# Patient Record
Sex: MALE | Race: WHITE | Employment: OTHER | URBAN - METROPOLITAN AREA
[De-identification: names, ages, dates, MRNs, and addresses within clinical notes are randomized per-mention and may not be internally consistent; named-entity substitution may affect disease eponyms.]

---

## 2017-09-11 ENCOUNTER — APPOINTMENT (OUTPATIENT)
Dept: GENERAL RADIOLOGY | Age: 77
DRG: 287 | End: 2017-09-11
Attending: EMERGENCY MEDICINE
Payer: MEDICARE

## 2017-09-11 ENCOUNTER — APPOINTMENT (OUTPATIENT)
Dept: ULTRASOUND IMAGING | Age: 77
DRG: 287 | End: 2017-09-11
Attending: HOSPITALIST
Payer: MEDICARE

## 2017-09-11 ENCOUNTER — APPOINTMENT (OUTPATIENT)
Dept: CT IMAGING | Age: 77
DRG: 287 | End: 2017-09-11
Attending: HOSPITALIST
Payer: MEDICARE

## 2017-09-11 ENCOUNTER — APPOINTMENT (OUTPATIENT)
Dept: GENERAL RADIOLOGY | Age: 77
DRG: 287 | End: 2017-09-11
Attending: HOSPITALIST
Payer: MEDICARE

## 2017-09-11 ENCOUNTER — HOSPITAL ENCOUNTER (INPATIENT)
Age: 77
LOS: 5 days | Discharge: NURSING, REHAB OR LONG-TERM CARE | DRG: 287 | End: 2017-09-18
Attending: EMERGENCY MEDICINE | Admitting: INTERNAL MEDICINE
Payer: MEDICARE

## 2017-09-11 DIAGNOSIS — R73.9 HYPERGLYCEMIA: ICD-10-CM

## 2017-09-11 DIAGNOSIS — R77.8 ELEVATED TROPONIN: ICD-10-CM

## 2017-09-11 DIAGNOSIS — R55 SYNCOPE AND COLLAPSE: Primary | ICD-10-CM

## 2017-09-11 PROBLEM — R07.2 PRECORDIAL PAIN: Status: ACTIVE | Noted: 2017-09-11

## 2017-09-11 PROBLEM — Z91.199 NONCOMPLIANCE: Chronic | Status: ACTIVE | Noted: 2017-09-11

## 2017-09-11 PROBLEM — I50.22 CHRONIC SYSTOLIC CHF (CONGESTIVE HEART FAILURE) (HCC): Chronic | Status: ACTIVE | Noted: 2017-09-11

## 2017-09-11 PROBLEM — Z86.73 HX OF STROKE WITHOUT RESIDUAL DEFICITS: Chronic | Status: ACTIVE | Noted: 2017-09-11

## 2017-09-11 PROBLEM — N17.9 AKI (ACUTE KIDNEY INJURY) (HCC): Status: ACTIVE | Noted: 2017-09-11

## 2017-09-11 PROBLEM — I25.810 CORONARY ARTERY DISEASE INVOLVING CORONARY BYPASS GRAFT: Chronic | Status: ACTIVE | Noted: 2017-09-11

## 2017-09-11 LAB
ALBUMIN SERPL-MCNC: 3.6 G/DL (ref 3.2–4.6)
ALBUMIN/GLOB SERPL: 1 {RATIO} (ref 1.2–3.5)
ALP SERPL-CCNC: 140 U/L (ref 50–136)
ALT SERPL-CCNC: 13 U/L (ref 12–65)
ANION GAP SERPL CALC-SCNC: 11 MMOL/L (ref 7–16)
APPEARANCE UR: CLEAR
AST SERPL-CCNC: 7 U/L (ref 15–37)
BACTERIA URNS QL MICRO: 0 /HPF
BASOPHILS # BLD: 0 K/UL (ref 0–0.2)
BASOPHILS NFR BLD: 1 % (ref 0–2)
BILIRUB SERPL-MCNC: 0.6 MG/DL (ref 0.2–1.1)
BILIRUB UR QL: NEGATIVE
BNP SERPL-MCNC: 282 PG/ML
BUN SERPL-MCNC: 17 MG/DL (ref 8–23)
CALCIUM SERPL-MCNC: 9 MG/DL (ref 8.3–10.4)
CASTS URNS QL MICRO: 0 /LPF
CHLORIDE SERPL-SCNC: 102 MMOL/L (ref 98–107)
CO2 SERPL-SCNC: 25 MMOL/L (ref 21–32)
COLOR UR: YELLOW
CREAT SERPL-MCNC: 1.38 MG/DL (ref 0.8–1.5)
CRP SERPL-MCNC: 0.5 MG/DL (ref 0–0.9)
D DIMER PPP FEU-MCNC: 0.55 UG/ML(FEU)
DIFFERENTIAL METHOD BLD: ABNORMAL
EOSINOPHIL # BLD: 0.1 K/UL (ref 0–0.8)
EOSINOPHIL NFR BLD: 2 % (ref 0.5–7.8)
EPI CELLS #/AREA URNS HPF: 0 /HPF
ERYTHROCYTE [DISTWIDTH] IN BLOOD BY AUTOMATED COUNT: 12.9 % (ref 11.9–14.6)
GLOBULIN SER CALC-MCNC: 3.5 G/DL (ref 2.3–3.5)
GLUCOSE BLD STRIP.AUTO-MCNC: 109 MG/DL (ref 65–100)
GLUCOSE BLD STRIP.AUTO-MCNC: 163 MG/DL (ref 65–100)
GLUCOSE SERPL-MCNC: 541 MG/DL (ref 65–100)
GLUCOSE UR STRIP.AUTO-MCNC: >1000 MG/DL
HCT VFR BLD AUTO: 38.2 % (ref 41.1–50.3)
HGB BLD-MCNC: 13.8 G/DL (ref 13.6–17.2)
HGB UR QL STRIP: ABNORMAL
IMM GRANULOCYTES # BLD: 0 K/UL (ref 0–0.5)
IMM GRANULOCYTES NFR BLD: 0.2 % (ref 0–5)
KETONES UR QL STRIP.AUTO: 15 MG/DL
LEUKOCYTE ESTERASE UR QL STRIP.AUTO: NEGATIVE
LYMPHOCYTES # BLD: 1.1 K/UL (ref 0.5–4.6)
LYMPHOCYTES NFR BLD: 18 % (ref 13–44)
MAGNESIUM SERPL-MCNC: 1.8 MG/DL (ref 1.8–2.4)
MCH RBC QN AUTO: 30.5 PG (ref 26.1–32.9)
MCHC RBC AUTO-ENTMCNC: 36.1 G/DL (ref 31.4–35)
MCV RBC AUTO: 84.3 FL (ref 79.6–97.8)
MONOCYTES # BLD: 0.4 K/UL (ref 0.1–1.3)
MONOCYTES NFR BLD: 6 % (ref 4–12)
NEUTS SEG # BLD: 4.6 K/UL (ref 1.7–8.2)
NEUTS SEG NFR BLD: 73 % (ref 43–78)
NITRITE UR QL STRIP.AUTO: NEGATIVE
PH UR STRIP: 5 [PH] (ref 5–9)
PLATELET # BLD AUTO: 179 K/UL (ref 150–450)
PMV BLD AUTO: 12 FL (ref 10.8–14.1)
POTASSIUM SERPL-SCNC: 4.4 MMOL/L (ref 3.5–5.1)
PROT SERPL-MCNC: 7.1 G/DL (ref 6.3–8.2)
PROT UR STRIP-MCNC: NEGATIVE MG/DL
RBC # BLD AUTO: 4.53 M/UL (ref 4.23–5.67)
RBC #/AREA URNS HPF: ABNORMAL /HPF
SODIUM SERPL-SCNC: 138 MMOL/L (ref 136–145)
SP GR UR REFRACTOMETRY: 1.04 (ref 1–1.02)
TROPONIN I SERPL-MCNC: 0.19 NG/ML (ref 0.02–0.05)
TROPONIN I SERPL-MCNC: 0.31 NG/ML (ref 0.02–0.05)
TSH SERPL DL<=0.005 MIU/L-ACNC: 1.4 UIU/ML (ref 0.36–3.74)
UROBILINOGEN UR QL STRIP.AUTO: 0.2 EU/DL (ref 0.2–1)
WBC # BLD AUTO: 6.2 K/UL (ref 4.3–11.1)
WBC URNS QL MICRO: ABNORMAL /HPF

## 2017-09-11 PROCEDURE — 74011000302 HC RX REV CODE- 302: Performed by: HOSPITALIST

## 2017-09-11 PROCEDURE — 86580 TB INTRADERMAL TEST: CPT | Performed by: HOSPITALIST

## 2017-09-11 PROCEDURE — 74011636637 HC RX REV CODE- 636/637: Performed by: EMERGENCY MEDICINE

## 2017-09-11 PROCEDURE — 96374 THER/PROPH/DIAG INJ IV PUSH: CPT | Performed by: EMERGENCY MEDICINE

## 2017-09-11 PROCEDURE — 84484 ASSAY OF TROPONIN QUANT: CPT | Performed by: EMERGENCY MEDICINE

## 2017-09-11 PROCEDURE — 74011250636 HC RX REV CODE- 250/636: Performed by: HOSPITALIST

## 2017-09-11 PROCEDURE — 82962 GLUCOSE BLOOD TEST: CPT

## 2017-09-11 PROCEDURE — 99218 HC RM OBSERVATION: CPT

## 2017-09-11 PROCEDURE — 73630 X-RAY EXAM OF FOOT: CPT

## 2017-09-11 PROCEDURE — 85025 COMPLETE CBC W/AUTO DIFF WBC: CPT | Performed by: EMERGENCY MEDICINE

## 2017-09-11 PROCEDURE — 96361 HYDRATE IV INFUSION ADD-ON: CPT | Performed by: EMERGENCY MEDICINE

## 2017-09-11 PROCEDURE — 86140 C-REACTIVE PROTEIN: CPT | Performed by: HOSPITALIST

## 2017-09-11 PROCEDURE — 81001 URINALYSIS AUTO W/SCOPE: CPT | Performed by: HOSPITALIST

## 2017-09-11 PROCEDURE — 99284 EMERGENCY DEPT VISIT MOD MDM: CPT | Performed by: EMERGENCY MEDICINE

## 2017-09-11 PROCEDURE — 83880 ASSAY OF NATRIURETIC PEPTIDE: CPT | Performed by: HOSPITALIST

## 2017-09-11 PROCEDURE — 74011250636 HC RX REV CODE- 250/636: Performed by: EMERGENCY MEDICINE

## 2017-09-11 PROCEDURE — 85379 FIBRIN DEGRADATION QUANT: CPT | Performed by: HOSPITALIST

## 2017-09-11 PROCEDURE — 96366 THER/PROPH/DIAG IV INF ADDON: CPT | Performed by: EMERGENCY MEDICINE

## 2017-09-11 PROCEDURE — 93880 EXTRACRANIAL BILAT STUDY: CPT

## 2017-09-11 PROCEDURE — 70450 CT HEAD/BRAIN W/O DYE: CPT

## 2017-09-11 PROCEDURE — 71010 XR CHEST PORT: CPT

## 2017-09-11 PROCEDURE — 74011250637 HC RX REV CODE- 250/637: Performed by: HOSPITALIST

## 2017-09-11 PROCEDURE — 80053 COMPREHEN METABOLIC PANEL: CPT | Performed by: EMERGENCY MEDICINE

## 2017-09-11 PROCEDURE — 74011636637 HC RX REV CODE- 636/637: Performed by: HOSPITALIST

## 2017-09-11 PROCEDURE — 93005 ELECTROCARDIOGRAM TRACING: CPT | Performed by: EMERGENCY MEDICINE

## 2017-09-11 PROCEDURE — 84443 ASSAY THYROID STIM HORMONE: CPT | Performed by: HOSPITALIST

## 2017-09-11 PROCEDURE — 83735 ASSAY OF MAGNESIUM: CPT | Performed by: HOSPITALIST

## 2017-09-11 RX ORDER — INSULIN LISPRO 100 [IU]/ML
INJECTION, SOLUTION INTRAVENOUS; SUBCUTANEOUS
Status: DISCONTINUED | OUTPATIENT
Start: 2017-09-11 | End: 2017-09-18 | Stop reason: HOSPADM

## 2017-09-11 RX ORDER — ATORVASTATIN CALCIUM 40 MG/1
40 TABLET, FILM COATED ORAL
Status: DISCONTINUED | OUTPATIENT
Start: 2017-09-11 | End: 2017-09-18 | Stop reason: HOSPADM

## 2017-09-11 RX ORDER — DOCUSATE SODIUM 100 MG/1
100 CAPSULE, LIQUID FILLED ORAL DAILY
Status: DISCONTINUED | OUTPATIENT
Start: 2017-09-12 | End: 2017-09-18 | Stop reason: HOSPADM

## 2017-09-11 RX ORDER — SODIUM CHLORIDE 0.9 % (FLUSH) 0.9 %
5-10 SYRINGE (ML) INJECTION EVERY 8 HOURS
Status: DISCONTINUED | OUTPATIENT
Start: 2017-09-11 | End: 2017-09-18 | Stop reason: HOSPADM

## 2017-09-11 RX ORDER — INSULIN GLARGINE 100 [IU]/ML
8 INJECTION, SOLUTION SUBCUTANEOUS
Status: DISCONTINUED | OUTPATIENT
Start: 2017-09-11 | End: 2017-09-13

## 2017-09-11 RX ORDER — SODIUM CHLORIDE 9 MG/ML
75 INJECTION, SOLUTION INTRAVENOUS CONTINUOUS
Status: DISCONTINUED | OUTPATIENT
Start: 2017-09-11 | End: 2017-09-12

## 2017-09-11 RX ORDER — SODIUM CHLORIDE 0.9 % (FLUSH) 0.9 %
5-10 SYRINGE (ML) INJECTION AS NEEDED
Status: DISCONTINUED | OUTPATIENT
Start: 2017-09-11 | End: 2017-09-18 | Stop reason: HOSPADM

## 2017-09-11 RX ORDER — GUAIFENESIN 100 MG/5ML
81 LIQUID (ML) ORAL DAILY
Status: DISCONTINUED | OUTPATIENT
Start: 2017-09-12 | End: 2017-09-18 | Stop reason: HOSPADM

## 2017-09-11 RX ORDER — HEPARIN SODIUM 5000 [USP'U]/ML
4000 INJECTION, SOLUTION INTRAVENOUS; SUBCUTANEOUS ONCE
Status: DISCONTINUED | OUTPATIENT
Start: 2017-09-12 | End: 2017-09-11 | Stop reason: ALTCHOICE

## 2017-09-11 RX ORDER — HEPARIN SODIUM 5000 [USP'U]/100ML
12-25 INJECTION, SOLUTION INTRAVENOUS
Status: DISCONTINUED | OUTPATIENT
Start: 2017-09-12 | End: 2017-09-11

## 2017-09-11 RX ORDER — AMLODIPINE BESYLATE 10 MG/1
10 TABLET ORAL DAILY
Status: DISCONTINUED | OUTPATIENT
Start: 2017-09-12 | End: 2017-09-14

## 2017-09-11 RX ORDER — ACETAMINOPHEN 325 MG/1
650 TABLET ORAL
Status: DISCONTINUED | OUTPATIENT
Start: 2017-09-11 | End: 2017-09-18 | Stop reason: HOSPADM

## 2017-09-11 RX ADMIN — SODIUM CHLORIDE 1000 ML: 900 INJECTION, SOLUTION INTRAVENOUS at 17:38

## 2017-09-11 RX ADMIN — ATORVASTATIN CALCIUM 40 MG: 40 TABLET, FILM COATED ORAL at 22:42

## 2017-09-11 RX ADMIN — APIXABAN 5 MG: 5 TABLET, FILM COATED ORAL at 22:42

## 2017-09-11 RX ADMIN — INSULIN HUMAN 10 UNITS: 100 INJECTION, SOLUTION PARENTERAL at 17:38

## 2017-09-11 RX ADMIN — Medication 10 ML: at 22:42

## 2017-09-11 RX ADMIN — TUBERCULIN PURIFIED PROTEIN DERIVATIVE 5 UNITS: 5 INJECTION, SOLUTION INTRADERMAL at 22:43

## 2017-09-11 RX ADMIN — SODIUM CHLORIDE 75 ML/HR: 900 INJECTION, SOLUTION INTRAVENOUS at 19:26

## 2017-09-11 RX ADMIN — INSULIN GLARGINE 8 UNITS: 100 INJECTION, SOLUTION SUBCUTANEOUS at 22:41

## 2017-09-11 NOTE — ED NOTES
Patient to ed via [facility]. Patient with syncopal episode at grocery store. Per ems, + LOC. Patient alert/oriented upon ems arrival on scene. No apparent injuries from syncopal episode. BGL 464mg/dl initally. Patient has been w/o his insulin x 1 week.

## 2017-09-11 NOTE — H&P
HOSPITALIST H&P/CONSULT  NAME:  Mishel Calvert   Age:  67 y.o.  :   1940   DOS:               17  MRN:   [id number]  PCP: Juan Osman NP  Consulting MD:  Treatment Team: Attending Provider: Karen Everett MD; Primary Nurse: Varghese Guzmán, RN    HPI:   Mr. Sunni Hawthorne is a 68 yo M with PMHx of CAD s/p CABG x2 (1st one when he was 34 yo), chronic systolic CHF with EF 14% in 10/10/2015 followed by Dr. Kevin Ribera MD, IDDM on novolog Mix 70/30 12 UI BID, HTN, Hx of recurrent CVA on chronic eliquis, hx of noncompliance with medication presented to [facility] brought by EMS after he had a syncopal event while he was doing his groceries. Unknown how log LOC was. Unknown if head trauma. States that prior to that he had severe chest pain in midsternal area 7/10 , w/o radiation for  approx 6-0 minutes. Received  mg  And 1 L NS per EMS. Mr. Sunni Hawthorne states that he didn't took his insulin for florence=porx 10 days. He states that he didn't took his other medications either, although family at bedside states that he is taking only his eliquis. Complains of polyuria and polydipsia in the last week. In ED WBC:6.2, BS:541, Cr:1.38, Trop I:0.19, EKG with old LBBB, sinus tach with fusion complexes.        10+ point ROS done and is negative except as noted in HPI. Past Medical History:   Diagnosis Date    CVA (cerebral infarction)     Diabetes ([facility] [address]     HTN (hypertension)       Past Surgical History:   Procedure Laterality Date    CARDIAC SURG PROCEDURE UNLIST      CABG      Prior to Admission Medications   Prescriptions Last Dose Informant Patient Reported? Taking? amLODIPine (NORVASC) 10 mg tablet   No No   Sig: Take 1 Tab by mouth daily. aspirin (ASPIRIN) 325 mg tablet   No No   Sig: Take 1 Tab by mouth daily. atorvastatin (LIPITOR) 40 mg tablet   No No   Sig: Take 1 Tab by mouth nightly. glyBURIDE (DIABETA) 2.5 mg tablet   No No   Sig: Take 1 Tab by mouth Daily (before breakfast).    magnesium oxide (MAG-OX) 400 mg tablet   No No   Sig: Take 1 tablet by mouth daily. nitroglycerin (NITROSTAT) 0.4 mg SL tablet   No No   Sig: Take 1 Tab by mouth every five (5) minutes as needed for Chest Pain. Sit/Lay down then put one tab under the tongue every 5 minutes as needed for chest pain for 3 doses      Facility-Administered Medications: None     Home meds reconciled. No Known Allergies   Social History   Substance Use Topics    Smoking status: Never Smoker    Smokeless tobacco: Never Used    Alcohol use No      Family History   Problem Relation Age of Onset    Stroke Father       I personally reviewed home medications, social and family history. There is no immunization history for the selected administration types on file for this patient. Objective:     Patient Vitals for the past 24 hrs:   Temp Pulse Resp BP SpO2   17 1608 98.4 °F (36.9 °C) (!) 115 20 161/84 99 %     Temp (24hrs), Av.4 °F (36.9 °C), Min:98.4 °F (36.9 °C), Max:98.4 °F (36.9 °C)    Oxygen Therapy  O2 Sat (%): 99 % (17 1608)  O2 Device: Room air (17 1608)  Oxygen Therapy  O2 Sat (%): 99 % (17 1608)  O2 Device: Room air (17 1608)    Physical Exam:  General:         Alert, cooperative, no acute distress . Afebrile. Underweight. Chronically ill loking  HEENT:               NCAT. No obvious deformity. Nares normal. No drainage  Lungs:                 Decreased air entry b/l. No wheezing/rhonchi/rales  Cardiovascular:   irregular. + murmur. No pedal edema b/l. +2 PT/DT pulses b/l. Abdomen:       S/nt/nd. Bowel sounds normal. .   Skin:         Not Jaundiced. R toe with ulcer and necrotic tissue. Neurologic:    AAOx3. CN II- XII grossly WNL. No gross focal deficit. Moves all extremities. Psychiatric:         Good mood. Normal affect.        Data Review:   Recent Results (from the past 24 hour(s))   CBC WITH AUTOMATED DIFF    Collection Time: 17  4:19 PM   Result Value Ref Range    WBC 6.2 4.3 - 11.1 K/uL    RBC 4. 53 4.23 - 5.67 M/uL    HGB 13.8 13.6 - 17.2 g/dL    HCT 38.2 (L) 41.1 - 50.3 %    MCV 84.3 79.6 - 97.8 FL    MCH 30.5 26.1 - 32.9 PG    MCHC 36.1 (H) 31.4 - 35.0 g/dL    RDW 12.9 11.9 - 14.6 %    PLATELET 774 205 - 549 K/uL    MPV 12.0 10.8 - 14.1 FL    DF AUTOMATED      NEUTROPHILS 73 43 - 78 %    LYMPHOCYTES 18 13 - 44 %    MONOCYTES 6 4.0 - 12.0 %    EOSINOPHILS 2 0.5 - 7.8 %    BASOPHILS 1 0.0 - 2.0 %    IMMATURE GRANULOCYTES 0.2 0.0 - 5.0 %    ABS. NEUTROPHILS 4.6 1.7 - 8.2 K/UL    ABS. LYMPHOCYTES 1.1 0.5 - 4.6 K/UL    ABS. MONOCYTES 0.4 0.1 - 1.3 K/UL    ABS. EOSINOPHILS 0.1 0.0 - 0.8 K/UL    ABS. BASOPHILS 0.0 0.0 - 0.2 K/UL    ABS. IMM. GRANS. 0.0 0.0 - 0.5 K/UL   METABOLIC PANEL, COMPREHENSIVE    Collection Time: 09/11/17  4:19 PM   Result Value Ref Range    Sodium 138 136 - 145 mmol/L    Potassium 4.4 3.5 - 5.1 mmol/L    Chloride 102 98 - 107 mmol/L    CO2 25 21 - 32 mmol/L    Anion gap 11 7 - 16 mmol/L    Glucose 541 (HH) 65 - 100 mg/dL    BUN 17 8 - 23 MG/DL    Creatinine 1.38 0.8 - 1.5 MG/DL    GFR est AA >60 >60 ml/min/1.73m2    GFR est non-AA 53 (L) >60 ml/min/1.73m2    Calcium 9.0 8.3 - 10.4 MG/DL    Bilirubin, total 0.6 0.2 - 1.1 MG/DL    ALT (SGPT) 13 12 - 65 U/L    AST (SGOT) 7 (L) 15 - 37 U/L    Alk.  phosphatase 140 (H) 50 - 136 U/L    Protein, total 7.1 6.3 - 8.2 g/dL    Albumin 3.6 3.2 - 4.6 g/dL    Globulin 3.5 2.3 - 3.5 g/dL    A-G Ratio 1.0 (L) 1.2 - 3.5     TROPONIN I    Collection Time: 09/11/17  4:19 PM   Result Value Ref Range    Troponin-I, Qt. 0.19 (HH) 0.02 - 0.05 NG/ML   BNP    Collection Time: 09/11/17  4:19 PM   Result Value Ref Range     pg/mL     All Micro Results     None          Imaging /Procedures /Studies:  I personally reviewed all labs, imaging, and other studies this admission:  CXR Results  (Last 48 hours)               09/11/17 7965  XR CHEST PORT Final result    Impression:  IMPRESSION: No acute findings in the chest           Narrative:  Chest X-ray INDICATION:   Syncope, chest pain       A portable AP view of the chest was obtained. FINDINGS: The lungs are clear. There are no infiltrates or effusions. There is   stable mild cardiomegaly. Sternotomy changes are present. CT Results  (Last 48 hours)    None              Assessment and Plan: Active Hospital Problems    Diagnosis Date Noted    Syncope and collapse 09/11/2017    Precordial pain 09/11/2017    Hx of stroke without residual deficits 09/11/2017    YULY (acute kidney injury) ([facility] [address] 09/11/2017    Chronic systolic CHF (congestive heart failure) ([facility] [address] 09/11/2017    Coronary artery disease involving coronary bypass graft 09/11/2017    Noncompliance 09/11/2017    HTN (hypertension) 10/10/2015    DM [facility]di[facility] mellitus) [facility] 10/10/2015       PLAN  Syncope and collapse: gentle IV hydration. serial Trop I. D-dimer. Echocardiogram. US b/l ICa. US venous LE. When more stable will get orthostatics. Get CT Head due to fall on Eliquis. Chest pain. Serial Trop I/EKG. Telemetry. Already received  mg. Consult cardiology. ED already discussed with cardiology on call. Appreciate the help. IDDM - non complaint. Start ISS and Lantus. Check Hg1c. Diabetes education. IV hydration    YULY - get UA. IV hydration. Chronic systolic CHF - will get Echocardiogram. Daily weight. Strict I/O. Will defer to cardiology. HTN: resume home meds    Noncompliance  : educated      DVT ppx: Eliquis - if CR head negative for bleding    Code status:  Full CODE  Estimated LOS:  1-2 days  Risk assessment:  high  Plan of care discussed with: patient and his family ta bedside in ED.  Care team.        Claudette Pax, MD  09/11/17

## 2017-09-11 NOTE — Clinical Note
Patient Class[de-identified] Observation [982] Type of Bed: Remote Telemetry [29] Reason for Observation: syncope. chest pain Admitting Diagnosis: Syncope and collapse [780. 2. ICD-9-CM] Admitting Physician: Judy Kim [[id number]] Attending Physician: Judy Kim [[id number]]

## 2017-09-11 NOTE — ED PROVIDER NOTES
HPI Comments: Patient is 76-year-old male who is coming in after having a syncopal episode. He has a history diabetes and normally takes insulin but he did not take it for the last 2 months due to cost.  He states he is not on any other medications. Our computer records does list several other medications though. He is a poor historian  And does not really remember having a syncopal episode. He denies any chest pain or shortness of breath to me now. But he does believe he had some earlier today. Per report patient given aspirin and ntg once with ems as he was complaining of chest pain then. Patient is a 67 y.o. male presenting with syncope. The history is provided by the patient. Syncope    Pertinent negatives include no chest pain, no palpitations, no fever, no abdominal pain, no nausea and no vomiting. Past Medical History:   Diagnosis Date    CVA (cerebral infarction) 2010    Diabetes ([facility] [address]     HTN (hypertension)        Past Surgical History:   Procedure Laterality Date    CARDIAC SURG PROCEDURE UNLIST      CABG         Family History:   Problem Relation Age of Onset    Stroke Father        Social History     Social History    Marital status:      Spouse name: N/A    Number of children: N/A    Years of education: N/A     Occupational History    Not on file. Social History Main Topics    Smoking status: Never Smoker    Smokeless tobacco: Never Used    Alcohol use No    Drug use: No    Sexual activity: Not on file     Other Topics Concern    Not on file     Social History Narrative         ALLERGIES: Review of patient's allergies indicates no known allergies. Review of Systems   Constitutional: Negative for chills and fever. Respiratory: Negative for chest tightness, shortness of breath, wheezing and stridor. Cardiovascular: Positive for syncope. Negative for chest pain and palpitations.    Gastrointestinal: Negative for abdominal pain, diarrhea, nausea and vomiting. Skin: Negative. All other systems reviewed and are negative. Vitals:    09/11/17 1608   BP: 161/84   Pulse: (!) 115   Resp: 20   Temp: 98.4 °F (36.9 °C)   SpO2: 99%   Weight: 70.3 kg (155 lb)   Height: 5' 11\" (1.803 m)            Physical Exam   Constitutional: He is oriented to person, place, and time. He appears well-developed and well-nourished. No distress. HENT:   Head: Normocephalic and atraumatic. Eyes: Conjunctivae are normal. No scleral icterus. Neck: Normal range of motion. Neck supple. Cardiovascular: Normal rate, regular rhythm, normal heart sounds and intact distal pulses. Exam reveals no gallop and no friction rub. No murmur heard. Pulmonary/Chest: Effort normal and breath sounds normal. No stridor. No respiratory distress. He has no wheezes. He has no rales. He exhibits no tenderness. Abdominal: Soft. He exhibits no distension. There is no tenderness. There is no rebound and no guarding. Neurological: He is alert and oriented to person, place, and time. No cranial nerve deficit. Coordination normal.   No focal weakness   Skin: Skin is warm and dry. No rash noted. He is not diaphoretic. No erythema. Psychiatric: He has a normal mood and affect. His behavior is normal.   Nursing note and vitals reviewed. MDM  Number of Diagnoses or Management Options  Diagnosis management comments: Patient currently has no chest pain does have a slightly elevated troponin of 0.19 and an elevated sugar of 541 no anion gap. I discussed case with Dr. Michell Meeks of cardiology and given the insulin noncompliance high glucose no active chest pain we will discuss with hospitalist for admission. I will treat her elevated glucose and we will repeat troponin levels. Patient given aspirin prior to arrival.      Louise Gold MD; 9/11/2017 @6:07 PM Voice dictation software was used during the making of this note.   This software is not perfect and grammatical and other typographical errors may be present.   This note has not been proofread for errors.  ===================================================================        Amount and/or Complexity of Data Reviewed  Clinical lab tests: ordered and reviewed (Results for orders placed or performed during the hospital encounter of 09/11/17  -CBC WITH AUTOMATED DIFF       Result                                            Value                         Ref Range                       WBC                                               6.2                           4.3 - 11.1 K/uL                 RBC                                               4.53                          4.23 - 5.67 M/uL                HGB                                               13.8                          13.6 - 17.2 g/dL                HCT                                               38.2 (L)                      41.1 - 50.3 %                   MCV                                               84.3                          79.6 - 97.8 FL                  MCH                                               30.5                          26.1 - 32.9 PG                  MCHC                                              36.1 (H)                      31.4 - 35.0 g/dL                RDW                                               12.9                          11.9 - 14.6 %                   PLATELET                                          179                           150 - 450 K/uL                  MPV                                               12.0                          10.8 - 14.1 FL                  DF                                                AUTOMATED                                                     NEUTROPHILS                                       73                            43 - 78 %                       LYMPHOCYTES                                       18                            13 - 44 %                       MONOCYTES 6                             4.0 - 12.0 %                    EOSINOPHILS                                       2                             0.5 - 7.8 %                     BASOPHILS                                         1                             0.0 - 2.0 %                     IMMATURE GRANULOCYTES                             0.2                           0.0 - 5.0 %                     ABS. NEUTROPHILS                                  4.6                           1.7 - 8.2 K/UL                  ABS. LYMPHOCYTES                                  1.1                           0.5 - 4.6 K/UL                  ABS. MONOCYTES                                    0.4                           0.1 - 1.3 K/UL                  ABS. EOSINOPHILS                                  0.1                           0.0 - 0.8 K/UL                  ABS. BASOPHILS                                    0.0                           0.0 - 0.2 K/UL                  ABS. IMM.  GRANS.                                  0.0                           0.0 - 0.5 K/UL             -METABOLIC PANEL, COMPREHENSIVE       Result                                            Value                         Ref Range                       Sodium                                            138                           136 - 145 mmol/L                Potassium                                         4.4                           3.5 - 5.1 mmol/L                Chloride                                          102                           98 - 107 mmol/L                 CO2                                               25                            21 - 32 mmol/L                  Anion gap                                         11                            7 - 16 mmol/L                   Glucose                                           541 (HH)                      65 - 100 mg/dL                  BUN                                               17 8 - 23 MG/DL                    Creatinine                                        1.38                          0.8 - 1.5 MG/DL                 GFR est AA                                        >60                           >60 ml/min/1.73m2               GFR est non-AA                                    53 (L)                        >60 ml/min/1.73m2               Calcium                                           9.0                           8.3 - 10.4 MG/DL                Bilirubin, total                                  0.6                           0.2 - 1.1 MG/DL                 ALT (SGPT)                                        13                            12 - 65 U/L                     AST (SGOT)                                        7 (L)                         15 - 37 U/L                     Alk. phosphatase                                  140 (H)                       50 - 136 U/L                    Protein, total                                    7.1                           6.3 - 8.2 g/dL                  Albumin                                           3.6                           3.2 - 4.6 g/dL                  Globulin                                          3.5                           2.3 - 3.5 g/dL                  A-G Ratio                                         1.0 (L)                       1.2 - 3.5                  -TROPONIN I       Result                                            Value                         Ref Range                       Troponin-I, Qt.                                   0.19 (HH)                     0.02 - 0.05 NG/ML         )  Tests in the radiology section of CPT®: ordered and reviewed  Independent visualization of images, tracings, or specimens: yes (LBBB rate 105 hx of LBBB)      ED Course       Procedures

## 2017-09-12 LAB
ATRIAL RATE: 105 BPM
CALCULATED P AXIS, ECG09: 64 DEGREES
CALCULATED R AXIS, ECG10: 48 DEGREES
CALCULATED T AXIS, ECG11: -169 DEGREES
DIAGNOSIS, 93000: NORMAL
EST. AVERAGE GLUCOSE BLD GHB EST-MCNC: 252 MG/DL
GLUCOSE BLD STRIP.AUTO-MCNC: 178 MG/DL (ref 65–100)
GLUCOSE BLD STRIP.AUTO-MCNC: 218 MG/DL (ref 65–100)
GLUCOSE BLD STRIP.AUTO-MCNC: 219 MG/DL (ref 65–100)
GLUCOSE BLD STRIP.AUTO-MCNC: 238 MG/DL (ref 65–100)
HBA1C MFR BLD: 10.4 % (ref 4.8–6)
MM INDURATION POC: 0 MM (ref 0–5)
P-R INTERVAL, ECG05: 154 MS
PPD POC: NORMAL NEGATIVE
Q-T INTERVAL, ECG07: 380 MS
QRS DURATION, ECG06: 148 MS
QTC CALCULATION (BEZET), ECG08: 502 MS
TROPONIN I SERPL-MCNC: 1.14 NG/ML (ref 0.02–0.05)
TROPONIN I SERPL-MCNC: 1.37 NG/ML (ref 0.02–0.05)
VENTRICULAR RATE, ECG03: 105 BPM

## 2017-09-12 PROCEDURE — 74011636637 HC RX REV CODE- 636/637: Performed by: HOSPITALIST

## 2017-09-12 PROCEDURE — 84484 ASSAY OF TROPONIN QUANT: CPT | Performed by: HOSPITALIST

## 2017-09-12 PROCEDURE — 36415 COLL VENOUS BLD VENIPUNCTURE: CPT | Performed by: HOSPITALIST

## 2017-09-12 PROCEDURE — 74011250636 HC RX REV CODE- 250/636: Performed by: NURSE PRACTITIONER

## 2017-09-12 PROCEDURE — C8929 TTE W OR WO FOL WCON,DOPPLER: HCPCS

## 2017-09-12 PROCEDURE — 74011250637 HC RX REV CODE- 250/637: Performed by: NURSE PRACTITIONER

## 2017-09-12 PROCEDURE — 74011250637 HC RX REV CODE- 250/637: Performed by: HOSPITALIST

## 2017-09-12 PROCEDURE — 99218 HC RM OBSERVATION: CPT

## 2017-09-12 PROCEDURE — 83036 HEMOGLOBIN GLYCOSYLATED A1C: CPT | Performed by: HOSPITALIST

## 2017-09-12 PROCEDURE — 82962 GLUCOSE BLOOD TEST: CPT

## 2017-09-12 PROCEDURE — 74011000250 HC RX REV CODE- 250: Performed by: HOSPITALIST

## 2017-09-12 PROCEDURE — 74011250636 HC RX REV CODE- 250/636: Performed by: HOSPITALIST

## 2017-09-12 RX ORDER — SODIUM CHLORIDE 9 MG/ML
75 INJECTION, SOLUTION INTRAVENOUS CONTINUOUS
Status: DISPENSED | OUTPATIENT
Start: 2017-09-12 | End: 2017-09-14

## 2017-09-12 RX ORDER — HEPARIN SODIUM 5000 [USP'U]/100ML
12-25 INJECTION, SOLUTION INTRAVENOUS
Status: DISCONTINUED | OUTPATIENT
Start: 2017-09-12 | End: 2017-09-13

## 2017-09-12 RX ORDER — HEPARIN SODIUM 5000 [USP'U]/ML
4000 INJECTION, SOLUTION INTRAVENOUS; SUBCUTANEOUS ONCE
Status: COMPLETED | OUTPATIENT
Start: 2017-09-12 | End: 2017-09-12

## 2017-09-12 RX ORDER — POVIDONE-IODINE 10 %
SOLUTION, NON-ORAL TOPICAL DAILY
Status: DISCONTINUED | OUTPATIENT
Start: 2017-09-12 | End: 2017-09-18 | Stop reason: HOSPADM

## 2017-09-12 RX ADMIN — Medication 5 ML: at 21:45

## 2017-09-12 RX ADMIN — AMLODIPINE BESYLATE 10 MG: 10 TABLET ORAL at 08:39

## 2017-09-12 RX ADMIN — INSULIN LISPRO 4 UNITS: 100 INJECTION, SOLUTION INTRAVENOUS; SUBCUTANEOUS at 11:30

## 2017-09-12 RX ADMIN — SODIUM CHLORIDE 50 ML/HR: 900 INJECTION, SOLUTION INTRAVENOUS at 11:29

## 2017-09-12 RX ADMIN — NITROGLYCERIN 1 INCH: 20 OINTMENT TOPICAL at 11:29

## 2017-09-12 RX ADMIN — DOCUSATE SODIUM 100 MG: 100 CAPSULE, LIQUID FILLED ORAL at 08:40

## 2017-09-12 RX ADMIN — PERFLUTREN 1 ML: 6.52 INJECTION, SUSPENSION INTRAVENOUS at 11:00

## 2017-09-12 RX ADMIN — NITROGLYCERIN 1 INCH: 20 OINTMENT TOPICAL at 17:11

## 2017-09-12 RX ADMIN — ATORVASTATIN CALCIUM 40 MG: 40 TABLET, FILM COATED ORAL at 21:45

## 2017-09-12 RX ADMIN — SODIUM CHLORIDE 50 ML/HR: 900 INJECTION, SOLUTION INTRAVENOUS at 05:37

## 2017-09-12 RX ADMIN — NITROGLYCERIN 1 INCH: 20 OINTMENT TOPICAL at 23:01

## 2017-09-12 RX ADMIN — INSULIN LISPRO 6 UNITS: 100 INJECTION, SOLUTION INTRAVENOUS; SUBCUTANEOUS at 21:44

## 2017-09-12 RX ADMIN — ASPIRIN 81 MG 81 MG: 81 TABLET ORAL at 08:40

## 2017-09-12 RX ADMIN — HEPARIN SODIUM AND DEXTROSE 12 UNITS/KG/HR: 5000; 5 INJECTION INTRAVENOUS at 23:03

## 2017-09-12 RX ADMIN — NITROGLYCERIN 1 INCH: 20 OINTMENT TOPICAL at 00:21

## 2017-09-12 RX ADMIN — Medication 10 ML: at 05:37

## 2017-09-12 RX ADMIN — HEPARIN SODIUM 4000 UNITS: 5000 INJECTION, SOLUTION INTRAVENOUS; SUBCUTANEOUS at 22:55

## 2017-09-12 RX ADMIN — Medication 10 ML: at 15:21

## 2017-09-12 RX ADMIN — INSULIN LISPRO 2 UNITS: 100 INJECTION, SOLUTION INTRAVENOUS; SUBCUTANEOUS at 08:39

## 2017-09-12 RX ADMIN — POVIDONE-IODINE: 10 SOLUTION TOPICAL at 15:21

## 2017-09-12 RX ADMIN — INSULIN GLARGINE 8 UNITS: 100 INJECTION, SOLUTION SUBCUTANEOUS at 21:42

## 2017-09-12 RX ADMIN — INSULIN LISPRO 4 UNITS: 100 INJECTION, SOLUTION INTRAVENOUS; SUBCUTANEOUS at 17:11

## 2017-09-12 RX ADMIN — NITROGLYCERIN 1 INCH: 20 OINTMENT TOPICAL at 05:36

## 2017-09-12 NOTE — CONSULTS
[facility] Cardiology Consult                Date of  Admission: 9/11/2017  4:12 PM     Primary Care Physician: Dr. Dwight Negron  Primary Cardiologist: Dr. Conti [facility])  Referring Physician: Dr. Jurado Comfort Physician: Dr. Radhames Mondragon    CC/Reason for consult: elevated troponin, chest pain       Teresa Fierro is a 67 y.o. male with past medical history of CAD with remote CABG, DM2, noncompliance with medications, remote CVAs on Eliquis, and HTN who presented to the ER via EMS after a syncopal episode with complaints of chest pain. Patient states that he was shopping for groceries when he passed out. Total length of LOC is unknown. After regaining consciousness, he reported chest pain across his chest. He reports a similar episode of chest pain approximately 2 weeks ago while cutting grass. He states that episode lasted approximately 30 minutes and went away on its own. Given patient's complaint of chest pain, EMS treated him with 324mg ASA and SL nitro x 1. Upon arrival to the ER, he denied chest pain or shortness of breath. Initial troponin was 0.19 with repeat of 0.31. Patient was also found to have a glucose of 541. He states that he is supposed to be on insulin but has not taken it in several months due to cost. Patient was admitted to the hospital by the hospitalist team. We have been asked to see patient in consultation regarding chest pain and elevated troponin.      Patient Active Problem List   Diagnosis Code    CVA (cerebral infarction) I63.9    HTN (hypertension) I10    DM (diabetes mellitus) ([facility] E11.9    Syncope and collapse R55    Precordial pain R07.2    Hx of stroke without residual deficits Z86.73    YULY (acute kidney injury) ([facility] N17.9    Chronic systolic CHF (congestive heart failure) ([facility]) I50.22    Coronary artery disease involving coronary bypass graft I25.810    Noncompliance Z91.19       Past Medical History:   Diagnosis Date    CVA (cerebral infarction) 2010    Diabetes ([facility]     HTN (hypertension)       Past Surgical History:   Procedure Laterality Date    CARDIAC SURG PROCEDURE UNLIST      CABG     No Known Allergies   Family History   Problem Relation Age of Onset    Stroke Father         Current Facility-Administered Medications   Medication Dose Route Frequency    insulin glargine (LANTUS) injection 8 Units  8 Units SubCUTAneous QHS    [START ON 9/12/2017] aspirin chewable tablet 81 mg  81 mg Oral DAILY    insulin lispro (HUMALOG) injection   SubCUTAneous AC&HS    0.9% sodium chloride infusion  75 mL/hr IntraVENous CONTINUOUS    [START ON 9/12/2017] amLODIPine (NORVASC) tablet 10 mg  10 mg Oral DAILY    atorvastatin (LIPITOR) tablet 40 mg  40 mg Oral QHS    sodium chloride (NS) flush 5-10 mL  5-10 mL IntraVENous Q8H    sodium chloride (NS) flush 5-10 mL  5-10 mL IntraVENous PRN    tuberculin injection 5 Units  5 Units IntraDERMal ONCE    acetaminophen (TYLENOL) tablet 650 mg  650 mg Oral Q4H PRN    promethazine (PHENERGAN) with saline injection 12.5 mg  12.5 mg IntraVENous Q6H PRN    [START ON 9/12/2017] docusate sodium (COLACE) capsule 100 mg  100 mg Oral DAILY    apixaban (ELIQUIS) tablet 5 mg  5 mg Oral Q12H    [START ON 9/12/2017] nitroglycerin (NITROBID) 2 % ointment 1 Inch  1 Inch Topical Q6H       Review of Systems   Cardiovascular: Positive for chest pain. Neurological: Positive for loss of consciousness. All other systems reviewed and are negative. Physical Exam  Vitals:    09/11/17 1608 09/11/17 2059 09/11/17 2201   BP: 161/84 124/75 129/79   Pulse: (!) 115 93 (!) 102   Resp: 20 20 20   Temp: 98.4 °F (36.9 °C) 98 °F (36.7 °C) 98.1 °F (36.7 °C)   SpO2: 99% 97% 98%   Weight: 70.3 kg (155 lb)  61.7 kg (136 lb 1.6 oz)   Height: 5' 11\" (1.803 m)  5' 11\" (1.803 m)       Physical Exam:  Physical Exam   Constitutional: He is oriented to person, place, and time and well-developed, well-nourished, and in no distress.    Cardiovascular: Normal rate and regular rhythm. Pulmonary/Chest: Effort normal.   Abdominal: Soft. Musculoskeletal:   + tremor right hand   Neurological: He is alert and oriented to person, place, and time. Slurred speech   Skin: Skin is warm and dry. Psychiatric: Affect normal.       Cardiographics    Telemetry: normal sinus rhythm  ECG: ST depression in inferolateral leads  Echocardiogram: ordered/pending  EF 40% by echo in 2015    Labs:   Recent Labs      09/11/17   1926  09/11/17   1619   NA   --   138   K   --   4.4   MG  1.8   --    BUN   --   17   CREA   --   1.38   GLU   --   541*   WBC   --   6.2   HGB   --   13.8   HCT   --   38.2*   PLT   --   179        Assessment/Plan:     Assessment:          Syncope and collapse : head CT shows multiple old infarcts, no acute intracranial abnormalities. Bilateral carotid ultrasound without evidence of hemodynamically significant stenosis. Per primary. HTN (hypertension) -- uncontrolled in the ER. Supposed to be on Norvasc as outpatient. Continue Norvasc on admission. Monitor BP closely. Titrate medications as needed. DM (diabetes mellitus) -- noncompliant with insulin as outpatient due to cost. Per primary team.       Precordial pain -- elevated troponin with mild pain tonight. Start topical nitrates. Unable to start IV heparin at this time, patient was given Eliquis on admission. Likely start heparin in the AM.       Hx of stroke without residual deficits -- reports compliance with Eliquis      YULY (acute kidney injury) -- gentle IV hydration per primary team.       Chronic systolic CHF (congestive heart failure) -- last known EF was 40% in 2015. NOT volume overloaded at this time. Not on sHF medications as outpatient. Check echo in the AM, if EF remains low, start correct medications. Coronary artery disease involving coronary bypass graft  -- per chart, had nuclear stress test done last year. Unable to see results. Continue ASA, statin.  Will discuss with attending in the AM regarding further testing. Noncompliance         Thank you very much for this referral. We appreciate the opportunity to participate in this patient's care. We will follow along with above stated plan.     Shai Reynaga NP  Consulting MD: Radhames Mondragon

## 2017-09-12 NOTE — WOUND CARE
Patient seen for right great toe wound. Noted no toenail present, skin layers around distal 1/2 of toe has been peeled back and most distal tip has dry intact non infected eschar 1.2x1.7 cm. Patient stated he removed his nail himself about 3 weeks ago. He also reports his toe is \"much better now\". Will start betadine to dry eschar today. Discussed with patient and primary nurse. He may cover with large bandaid at to protect if he desires. Wound team available as needed. Monitor for swelling and redness. Answered all patient questions.

## 2017-09-12 NOTE — PROGRESS NOTES
Spiritual Care visit. Initial visit. Patient visited by Akbar Blandon     Signed by David Montemayor M.Ed., Th.B. ,Staff

## 2017-09-12 NOTE — PROGRESS NOTES
Bedside and Verbal shift change report given to Jase Arboleda RN (oncoming nurse) by self [facility]la [facility] nurse). Report included the following information SBAR, Kardex, MAR and Recent Results.

## 2017-09-12 NOTE — PROGRESS NOTES
TRANSFER - IN REPORT:    Verbal report received from Richard RN(name) on Deo Hernandes  being received from ED(unit) for routine progression of care      Report consisted of patients Situation, Background, Assessment and   Recommendations(SBAR). Information from the following report(s) SBAR and ED Summary was reviewed with the receiving nurse. Opportunity for questions and clarification was provided. Assessment completed upon patients arrival to unit and care assumed. Dual skin assessment completed with second RN reveals R great toe wound with scab open to air, R knee scab, R shin small, pink healing abrasion, L shin pink abrasion, L knee scab. Heels and sacrum intact, without redness or breakdown.

## 2017-09-12 NOTE — PROGRESS NOTES
Brief hospitalist note    Hospitalist Progress Note    2017  Admit Date: 2017  4:12 PM   NAME: Franca Elizalde   :  1940   MRN:  [id number]   Attending: Kahlil Wilks MD  PCP:  Bernard Hall NP     Mr. Britton Moralez is a 66 yo M with PMHx of CAD s/p CABG x2 (1st one when he was 36 yo), chronic systolic CHF with EF 33% in 10/10/2015 followed by Dr. Ronnie Terry MD, IDDM on novolog Mix 70/30 12 UI BID, HTN, Hx of recurrent CVA on chronic eliquis, hx of noncompliance with medication presented to [facility] brought by EMS after he had a syncopal event while he was doing his groceries. Unknown how log LOC was. Unknown if head trauma. States that prior to that he had severe chest pain in midsternal area 7/10 , w/o radiation for  approx 6-0 minutes. Received  mg  And 1 L NS per EMS. Mr. Britton Moralez states that he didn't took his insulin for florence=porx 10 days. He states that he didn't took his other medications either, although family at bedside states that he is taking only his eliquis.  - cardiology has seen. Note plans for cath in AM. Patient denies any further chest pain. Review of Systems negative with exception of pertinent positives noted above  PHYSICAL EXAM     Visit Vitals    /69 (BP 1 Location: Left arm, BP Patient Position: Head of bed elevated (Comment degrees))    Pulse 71    Temp 97.9 °F (36.6 °C)    Resp 17    Ht 5' 11\" (1.803 m)    Wt 63 kg (138 lb 14.2 oz)    SpO2 98%    BMI 19.37 kg/m2      Temp (24hrs), Av °F (36.7 °C), Min:97.5 °F (36.4 °C), Max:98.2 °F (36.8 °C)    Oxygen Therapy  O2 Sat (%): 98 % (17)  Pulse via Oximetry: 93 beats per minute (17)  O2 Device: Room air (17)    Intake/Output Summary (Last 24 hours) at 17 1923  Last data filed at 17 1807   Gross per 24 hour   Intake              240 ml   Output              300 ml   Net              -60 ml      General: No acute distress    Lungs:  CTA Bilaterally. Heart:  Regular rate and rhythm,  No murmur, rub, or gallop  Abdomen: Soft, Non distended, Non tender, Positive bowel sounds  Extremities: No cyanosis, clubbing or edema  Neurologic:  No focal deficits    ASSESSMENT      Active Hospital Problems    Diagnosis Date Noted    Syncope and collapse 09/11/2017    Precordial pain 09/11/2017    Hx of stroke without residual deficits 09/11/2017    [facility]Y (acute [facility]) [facility] 09/11/2017    [facility] 09/11/2017    Coronary artery disease involving coronary bypass graft 09/11/2017    Noncompliance 09/11/2017    HTN (hypertension) 10/10/2015    DM [facility]diabetes mellitus) [facility] 10/10/2015     Plan:  · Syncope/collapse - CT head negative. Carotid duplex w/ significant stenosis. · Chest pain/hs of CAD s/p CABG - ?plan for cardiac cath in AM  · DM2 - non compliant. Would typically increase lantus coverage for suboptimal control but suspect will be NPO tonight. A1C 10.4. Will titrate as needed after procedure  · Hx of CVAs - on eliquis outpatient  · Systolic CHF - EF 66% on 7/12 echo.        Signed By: Huang Bejarano DO     September 12, 2017

## 2017-09-12 NOTE — PROGRESS NOTES
Problem: Falls - Risk of  Goal: *Absence of Falls  Document Jac Fall Risk and appropriate interventions in the flowsheet. Outcome: Progressing Towards Goal  Fall Risk Interventions:  Mobility Interventions: Bed/chair exit alarm           Medication Interventions: Bed/chair exit alarm           History of Falls Interventions: Bed/chair exit alarm  Pt progressing towards goal. No falls since admission. Bed low and locked. Call light within reach. Side rails x 2. Gripper socks applied. Personal belongings within reach. Pt verbalizes understanding to call for assistance.

## 2017-09-12 NOTE — PROGRESS NOTES
Bedside and Verbal shift change report received from Sarah Guevara RN (offgoing nurse). Report included the following information SBAR, Kardex, MAR and Recent Results.

## 2017-09-12 NOTE — PROGRESS NOTES
Bedside and Verbal shift change report given to Katerin Hein RN (oncoming nurse) by self (offgoing nurse). Report included the following information SBAR, Kardex, MAR and Recent Results.

## 2017-09-12 NOTE — PROGRESS NOTES
MD updated that no diet orders placed - [facility] cath planned for tomorrow (9/13), verbal order received to order Cardiac ADA diet.

## 2017-09-13 PROBLEM — R55 SYNCOPE: Status: ACTIVE | Noted: 2017-09-13

## 2017-09-13 LAB
ANION GAP SERPL CALC-SCNC: 8 MMOL/L (ref 7–16)
APTT PPP: 46 SEC (ref 23.5–31.7)
APTT PPP: 49 SEC (ref 23.5–31.7)
APTT PPP: 56.4 SEC (ref 23.5–31.7)
BUN SERPL-MCNC: 12 MG/DL (ref 8–23)
CALCIUM SERPL-MCNC: 8.1 MG/DL (ref 8.3–10.4)
CHLORIDE SERPL-SCNC: 107 MMOL/L (ref 98–107)
CO2 SERPL-SCNC: 24 MMOL/L (ref 21–32)
CREAT SERPL-MCNC: 0.93 MG/DL (ref 0.8–1.5)
ERYTHROCYTE [DISTWIDTH] IN BLOOD BY AUTOMATED COUNT: 12.9 % (ref 11.9–14.6)
GLUCOSE BLD STRIP.AUTO-MCNC: 119 MG/DL (ref 65–100)
GLUCOSE BLD STRIP.AUTO-MCNC: 167 MG/DL (ref 65–100)
GLUCOSE BLD STRIP.AUTO-MCNC: 176 MG/DL (ref 65–100)
GLUCOSE SERPL-MCNC: 112 MG/DL (ref 65–100)
HCT VFR BLD AUTO: 33.9 % (ref 41.1–50.3)
HGB BLD-MCNC: 12 G/DL (ref 13.6–17.2)
MCH RBC QN AUTO: 29.9 PG (ref 26.1–32.9)
MCHC RBC AUTO-ENTMCNC: 35.4 G/DL (ref 31.4–35)
MCV RBC AUTO: 84.5 FL (ref 79.6–97.8)
PLATELET # BLD AUTO: 161 K/UL (ref 150–450)
PMV BLD AUTO: 12 FL (ref 10.8–14.1)
POTASSIUM SERPL-SCNC: 3.6 MMOL/L (ref 3.5–5.1)
RBC # BLD AUTO: 4.01 M/UL (ref 4.23–5.67)
SODIUM SERPL-SCNC: 139 MMOL/L (ref 136–145)
WBC # BLD AUTO: 4.9 K/UL (ref 4.3–11.1)

## 2017-09-13 PROCEDURE — B2131ZZ FLUOROSCOPY OF MULTIPLE CORONARY ARTERY BYPASS GRAFTS USING LOW OSMOLAR CONTRAST: ICD-10-PCS | Performed by: INTERNAL MEDICINE

## 2017-09-13 PROCEDURE — 74011250636 HC RX REV CODE- 250/636: Performed by: NURSE PRACTITIONER

## 2017-09-13 PROCEDURE — 85730 THROMBOPLASTIN TIME PARTIAL: CPT | Performed by: INTERNAL MEDICINE

## 2017-09-13 PROCEDURE — 74011250636 HC RX REV CODE- 250/636: Performed by: HOSPITALIST

## 2017-09-13 PROCEDURE — 99153 MOD SED SAME PHYS/QHP EA: CPT

## 2017-09-13 PROCEDURE — 77030004534 HC CATH ANGI DX INFN CARD -A

## 2017-09-13 PROCEDURE — 74011250636 HC RX REV CODE- 250/636: Performed by: INTERNAL MEDICINE

## 2017-09-13 PROCEDURE — 99152 MOD SED SAME PHYS/QHP 5/>YRS: CPT

## 2017-09-13 PROCEDURE — 65660000000 HC RM CCU STEPDOWN

## 2017-09-13 PROCEDURE — B2181ZZ FLUOROSCOPY OF LEFT INTERNAL MAMMARY BYPASS GRAFT USING LOW OSMOLAR CONTRAST: ICD-10-PCS | Performed by: INTERNAL MEDICINE

## 2017-09-13 PROCEDURE — 65270000029 HC RM PRIVATE

## 2017-09-13 PROCEDURE — B2151ZZ FLUOROSCOPY OF LEFT HEART USING LOW OSMOLAR CONTRAST: ICD-10-PCS | Performed by: INTERNAL MEDICINE

## 2017-09-13 PROCEDURE — C1769 GUIDE WIRE: HCPCS

## 2017-09-13 PROCEDURE — C1887 CATHETER, GUIDING: HCPCS

## 2017-09-13 PROCEDURE — 99218 HC RM OBSERVATION: CPT

## 2017-09-13 PROCEDURE — 74011000258 HC RX REV CODE- 258: Performed by: INTERNAL MEDICINE

## 2017-09-13 PROCEDURE — 74011636320 HC RX REV CODE- 636/320: Performed by: INTERNAL MEDICINE

## 2017-09-13 PROCEDURE — 85730 THROMBOPLASTIN TIME PARTIAL: CPT | Performed by: NURSE PRACTITIONER

## 2017-09-13 PROCEDURE — 4A023N7 MEASUREMENT OF CARDIAC SAMPLING AND PRESSURE, LEFT HEART, PERCUTANEOUS APPROACH: ICD-10-PCS | Performed by: INTERNAL MEDICINE

## 2017-09-13 PROCEDURE — C1725 CATH, TRANSLUMIN NON-LASER: HCPCS

## 2017-09-13 PROCEDURE — 74011250636 HC RX REV CODE- 250/636

## 2017-09-13 PROCEDURE — C1894 INTRO/SHEATH, NON-LASER: HCPCS

## 2017-09-13 PROCEDURE — 36415 COLL VENOUS BLD VENIPUNCTURE: CPT | Performed by: NURSE PRACTITIONER

## 2017-09-13 PROCEDURE — 93459 L HRT ART/GRFT ANGIO: CPT

## 2017-09-13 PROCEDURE — 74011250637 HC RX REV CODE- 250/637: Performed by: NURSE PRACTITIONER

## 2017-09-13 PROCEDURE — 77030012468 HC VLV BLEEDBK CNTRL ABBT -B

## 2017-09-13 PROCEDURE — 74011250637 HC RX REV CODE- 250/637: Performed by: HOSPITALIST

## 2017-09-13 PROCEDURE — B2111ZZ FLUOROSCOPY OF MULTIPLE CORONARY ARTERIES USING LOW OSMOLAR CONTRAST: ICD-10-PCS | Performed by: INTERNAL MEDICINE

## 2017-09-13 PROCEDURE — 77030034849

## 2017-09-13 PROCEDURE — 80048 BASIC METABOLIC PNL TOTAL CA: CPT | Performed by: INTERNAL MEDICINE

## 2017-09-13 PROCEDURE — 74011250637 HC RX REV CODE- 250/637: Performed by: INTERNAL MEDICINE

## 2017-09-13 PROCEDURE — 74011000250 HC RX REV CODE- 250: Performed by: INTERNAL MEDICINE

## 2017-09-13 PROCEDURE — 85027 COMPLETE CBC AUTOMATED: CPT | Performed by: INTERNAL MEDICINE

## 2017-09-13 PROCEDURE — C1760 CLOSURE DEV, VASC: HCPCS

## 2017-09-13 PROCEDURE — 82962 GLUCOSE BLOOD TEST: CPT

## 2017-09-13 RX ORDER — INSULIN GLARGINE 100 [IU]/ML
12 INJECTION, SOLUTION SUBCUTANEOUS
Status: DISCONTINUED | OUTPATIENT
Start: 2017-09-13 | End: 2017-09-15

## 2017-09-13 RX ORDER — HEPARIN SODIUM 200 [USP'U]/100ML
3 INJECTION, SOLUTION INTRAVENOUS CONTINUOUS
Status: DISCONTINUED | OUTPATIENT
Start: 2017-09-13 | End: 2017-09-15

## 2017-09-13 RX ORDER — HEPARIN SODIUM 5000 [USP'U]/ML
35 INJECTION, SOLUTION INTRAVENOUS; SUBCUTANEOUS ONCE
Status: ACTIVE | OUTPATIENT
Start: 2017-09-13 | End: 2017-09-14

## 2017-09-13 RX ORDER — MIDAZOLAM HYDROCHLORIDE 1 MG/ML
.5-2 INJECTION, SOLUTION INTRAMUSCULAR; INTRAVENOUS
Status: DISCONTINUED | OUTPATIENT
Start: 2017-09-13 | End: 2017-09-15 | Stop reason: ALTCHOICE

## 2017-09-13 RX ORDER — FENTANYL CITRATE 50 UG/ML
25-50 INJECTION, SOLUTION INTRAMUSCULAR; INTRAVENOUS
Status: DISCONTINUED | OUTPATIENT
Start: 2017-09-13 | End: 2017-09-15 | Stop reason: ALTCHOICE

## 2017-09-13 RX ORDER — HALOPERIDOL 5 MG/ML
5 INJECTION INTRAMUSCULAR ONCE
Status: COMPLETED | OUTPATIENT
Start: 2017-09-13 | End: 2017-09-13

## 2017-09-13 RX ORDER — HYDROMORPHONE HYDROCHLORIDE 1 MG/ML
.5-1 INJECTION, SOLUTION INTRAMUSCULAR; INTRAVENOUS; SUBCUTANEOUS
Status: DISCONTINUED | OUTPATIENT
Start: 2017-09-13 | End: 2017-09-15 | Stop reason: ALTCHOICE

## 2017-09-13 RX ORDER — LIDOCAINE HYDROCHLORIDE 20 MG/ML
1-20 INJECTION, SOLUTION INFILTRATION; PERINEURAL
Status: DISCONTINUED | OUTPATIENT
Start: 2017-09-13 | End: 2017-09-15 | Stop reason: ALTCHOICE

## 2017-09-13 RX ORDER — HEPARIN SODIUM 5000 [USP'U]/ML
35 INJECTION, SOLUTION INTRAVENOUS; SUBCUTANEOUS ONCE
Status: COMPLETED | OUTPATIENT
Start: 2017-09-13 | End: 2017-09-13

## 2017-09-13 RX ADMIN — IOPAMIDOL 270 ML: 755 INJECTION, SOLUTION INTRAVENOUS at 18:36

## 2017-09-13 RX ADMIN — HEPARIN SODIUM 2200 UNITS: 5000 INJECTION, SOLUTION INTRAVENOUS; SUBCUTANEOUS at 08:08

## 2017-09-13 RX ADMIN — HYDROMORPHONE HYDROCHLORIDE 1 MG: 1 INJECTION, SOLUTION INTRAMUSCULAR; INTRAVENOUS; SUBCUTANEOUS at 18:21

## 2017-09-13 RX ADMIN — NITROGLYCERIN 0.2 MG: 200 INJECTION, SOLUTION INTRAVENOUS at 17:57

## 2017-09-13 RX ADMIN — NITROGLYCERIN 1 INCH: 20 OINTMENT TOPICAL at 06:03

## 2017-09-13 RX ADMIN — HEPARIN SODIUM 3 ML/HR: 200 INJECTION, SOLUTION INTRAVENOUS at 16:46

## 2017-09-13 RX ADMIN — Medication 10 ML: at 14:00

## 2017-09-13 RX ADMIN — ASPIRIN 81 MG 81 MG: 81 TABLET ORAL at 08:09

## 2017-09-13 RX ADMIN — AMLODIPINE BESYLATE 10 MG: 10 TABLET ORAL at 08:08

## 2017-09-13 RX ADMIN — MIDAZOLAM HYDROCHLORIDE 2 MG: 1 INJECTION, SOLUTION INTRAMUSCULAR; INTRAVENOUS at 16:53

## 2017-09-13 RX ADMIN — HALOPERIDOL LACTATE 5 MG: 5 INJECTION, SOLUTION INTRAMUSCULAR at 22:58

## 2017-09-13 RX ADMIN — MIDAZOLAM HYDROCHLORIDE 2 MG: 1 INJECTION, SOLUTION INTRAMUSCULAR; INTRAVENOUS at 18:20

## 2017-09-13 RX ADMIN — LIDOCAINE HYDROCHLORIDE 140 MG: 20 INJECTION, SOLUTION INFILTRATION; PERINEURAL at 16:55

## 2017-09-13 RX ADMIN — DOCUSATE SODIUM 100 MG: 100 CAPSULE, LIQUID FILLED ORAL at 08:09

## 2017-09-13 RX ADMIN — NITROGLYCERIN 1 INCH: 20 OINTMENT TOPICAL at 12:53

## 2017-09-13 RX ADMIN — BIVALIRUDIN 1.75 MG/KG/HR: 250 INJECTION, POWDER, LYOPHILIZED, FOR SOLUTION INTRAVENOUS at 17:30

## 2017-09-13 RX ADMIN — TICAGRELOR 180 MG: 90 TABLET ORAL at 17:35

## 2017-09-13 RX ADMIN — SODIUM CHLORIDE 50 ML/HR: 900 INJECTION, SOLUTION INTRAVENOUS at 08:08

## 2017-09-13 RX ADMIN — POVIDONE-IODINE: 10 SOLUTION TOPICAL at 08:09

## 2017-09-13 NOTE — PROGRESS NOTES
TRANSFER - IN REPORT:    Verbal report received from CARLOS Steiner on Shala Ernst  being received from [facility] for routine progression of care. Report consisted of patients Situation, Background, Assessment and   Recommendations(SBAR). Information from the following reports was reviewed: Kardex, Procedure Summary, MAR and Recent Results. Opportunity for questions and clarification was provided. Patient received to room 330 and connected to telemetry monitor. Assessment completed and plan of care reviewed. Right groin, Left radial Site dry and intact without hematoma. BP cycling every 15 minutes. Patient oriented to room and call light. Patient voiced understanding to lay flat with affected leg straight. Patient aware of NPO status. Patient voiced understanding to use call light to communicate needs. Bedside and Verbal shift change report given to Yosef Benavidez RN (oncoming nurse) by self Yeny Toscano nurse). Report included the following information SBAR, Kardex, MAR and Recent Results.

## 2017-09-13 NOTE — PROGRESS NOTES
Bedside and Verbal shift change report given to Nic Hauser RN (oncoming nurse) by Destiney Mendoza (offgoing nurse). Report included the following information Kardex, MAR and Cardiac Rhythm NSR.

## 2017-09-13 NOTE — PROGRESS NOTES
TRANSFER - OUT REPORT:    Verbal report given to rn, RN on Renato Lewis  being transferred to [facility] for routine progression of care       Report consisted of patients Situation, Background, Assessment and Recommendations(SBAR). Information from the following report(s) SBAR was reviewed with the receiving nurse. Opportunity for questions and clarification was provided.     [facility] Dr Sonia Berg  Unsuccessful intervention  Right groin perclose  Left wrist puncture site  No bleeding or hematoma  Versed 2mg

## 2017-09-13 NOTE — PROGRESS NOTES
Bedside and Verbal shift change report received from Twila Harden RN (offgoing nurse). Report included the following information SBAR, Kardex, MAR and Recent Results. Heparin gtt rate verified per MAR.

## 2017-09-13 NOTE — PROGRESS NOTES
Problem: Falls - Risk of  Goal: *Absence of Falls  Document Jac Fall Risk and appropriate interventions in the flowsheet.    Outcome: Progressing Towards Goal  Fall Risk Interventions:  Mobility Interventions: Bed/chair exit alarm           Medication Interventions: Bed/chair exit alarm, Patient to call before getting OOB     Elimination Interventions: Bed/chair exit alarm, Call light in reach     History of Falls Interventions: Bed/chair exit alarm, Investigate reason for fall

## 2017-09-13 NOTE — PROGRESS NOTES
Brief hospitalist note    Hospitalist Progress Note    2017  Admit Date: 2017  4:12 PM   NAME: Mane Garza   :  1940   MRN:  [id number]   Attending: Bishop Wills DO  PCP:  Ken Kramer NP     Mr. Bowen Blankenship is a 66 yo M with PMHx of CAD s/p CABG x2 (1st one when he was 36 yo), chronic systolic CHF with EF 35% in 10/10/2015 followed by Dr. Valentin Rendon MD, IDDM on novolog Mix 70/30 12 UI BID, HTN, Hx of recurrent CVA on chronic eliquis, hx of noncompliance with medication presented to [facility] brought by EMS after he had a syncopal event while he was doing his groceries. Unknown how log LOC was. Unknown if head trauma. States that prior to that he had severe chest pain in midsternal area 7/10 , w/o radiation for  approx 6-0 minutes. Received  mg  And 1 L NS per EMS. Mr. Bowen Blankenship states that he didn't took his insulin for florence=porx 10 days. He states that he didn't took his other medications either, although family at bedside states that he is taking only his eliquis.  - cardiology has seen. Note plans for cath in AM. Patient denies any further chest pain.  - no overnight events.  Plans for cardiac cath today    Review of Systems negative with exception of pertinent positives noted above  PHYSICAL EXAM     Visit Vitals    /60 (BP 1 Location: Left arm, BP Patient Position: At rest)    Pulse 73    Temp 97.9 °F (36.6 °C)    Resp 16    Ht 5' 11\" (1.803 m)    Wt 63.3 kg (139 lb 9.6 oz)    SpO2 98%    BMI 19.47 kg/m2      Temp (24hrs), Av °F (36.7 °C), Min:97.5 °F (36.4 °C), Max:98.3 °F (36.8 °C)    Oxygen Therapy  O2 Sat (%): 98 % (17 1248)  Pulse via Oximetry: 93 beats per minute (17)  O2 Device: Room air (17 1248)    Intake/Output Summary (Last 24 hours) at 17 1442  Last data filed at 17 1316   Gross per 24 hour   Intake              120 ml   Output              400 ml   Net             -280 ml      General: No acute distress    Lungs:  CTA Bilaterally. Heart:  Regular rate and rhythm,  No murmur, rub, or gallop  Abdomen: Soft, Non distended, Non tender, Positive bowel sounds  Extremities: No cyanosis, clubbing or edema  Neurologic:  No focal deficits    ASSESSMENT      Active Hospital Problems    Diagnosis Date Noted    Syncope and collapse 09/11/2017    Precordial pain 09/11/2017    Hx of stroke without residual deficits 09/11/2017    YULY (acute [facility]) ([facility] 09/11/2017    Chronic systolic CHF (congestive heart failure) ([facility] 09/11/2017    Coronary artery disease involving coronary bypass graft 09/11/2017    Noncompliance 09/11/2017    HTN (hypertension) 10/10/2015    DM (diabetes mellitus) ([facility] 10/10/2015     Plan:  · Syncope/collapse - CT head negative. Carotid duplex w/ significant stenosis. Cardiology w/u in process  · Chest pain/hs of CAD s/p CABG - cardiac cath planned today. Hep gtt  · DM2 - non compliant. A1C 10.4. 's today today but NPO. will increase lantus coverage tonight with note to call MD for reduced dose if still NPO tonight. ·  Hx of CVAs - on eliquis outpatient - on hold for procedure   · Systolic CHF - EF 25% on 7/83 echo. As per cardiology    dvt prophy - hep gtt  dispo - may need rehab. Place pt/ot/ppd    Hospitalist consulting - Cardiology assuming care as of 9/12 per Dr Jacinta Diego notes.        Signed By: Amanda Roe DO     September 13, 2017

## 2017-09-13 NOTE — PHYSICIAN ADVISORY
Letter of Determination: Upgrade from Observation to Inpatient Status    This patient was originally hospitalized as observation status on 9/11/2017 for Syncope. This patient now meets for Inpatient Admission in accordance with CMS regulation Section 43 .3. Specifically, patient's stay is now over Two Midnights and was medically necessary. The patient's stay was medically necessary based on elevated troponin levels to 1.37 ng/ml, cardiomyopathy with ejection fraction 30% on echocardiogram performed on 9/11/2017, and a medical plan that included acute cardiac cath evaluation for an acute myocardial infarction. Consistent with CMS guidelines, patient meets for inpatient status. It is our recommendation that this patient's hospitalization status should be upgraded from OBSERVATION to INPATIENT status.      The final decision regarding the patient's hospitalization status depends on the attending physician's judgement.     Joyce Brown MD, ANGELY,   Physician [place].

## 2017-09-13 NOTE — PROGRESS NOTES
TRANSFER - OUT REPORT:    Verbal report given to Cheng Gamez RN on Masood Leon  being transferred to [facility] for ordered procedure       Report consisted of patients Situation, Background, Assessment and Recommendations(SBAR). Information from the following report(s) SBAR, Kardex, [facility] and Recent Results was reviewed with the receiving nurse. Opportunity for questions and clarification was provided. 2 IVs patent, patient prepped for procedure, Heparin stopped on MAR prior to leaving floor, consent for procedure signed and placed in chart.

## 2017-09-14 LAB
ANION GAP SERPL CALC-SCNC: 12 MMOL/L (ref 7–16)
BUN SERPL-MCNC: 11 MG/DL (ref 8–23)
CALCIUM SERPL-MCNC: 8.4 MG/DL (ref 8.3–10.4)
CHLORIDE SERPL-SCNC: 108 MMOL/L (ref 98–107)
CO2 SERPL-SCNC: 22 MMOL/L (ref 21–32)
CREAT SERPL-MCNC: 1 MG/DL (ref 0.8–1.5)
GLUCOSE BLD STRIP.AUTO-MCNC: 102 MG/DL (ref 65–100)
GLUCOSE BLD STRIP.AUTO-MCNC: 116 MG/DL (ref 65–100)
GLUCOSE BLD STRIP.AUTO-MCNC: 145 MG/DL (ref 65–100)
GLUCOSE BLD STRIP.AUTO-MCNC: 183 MG/DL (ref 65–100)
GLUCOSE BLD STRIP.AUTO-MCNC: 61 MG/DL (ref 65–100)
GLUCOSE BLD STRIP.AUTO-MCNC: 83 MG/DL (ref 65–100)
GLUCOSE SERPL-MCNC: 119 MG/DL (ref 65–100)
MM INDURATION POC: 0 MM (ref 0–5)
POTASSIUM SERPL-SCNC: 3.9 MMOL/L (ref 3.5–5.1)
PPD POC: NEGATIVE NEGATIVE
SODIUM SERPL-SCNC: 142 MMOL/L (ref 136–145)

## 2017-09-14 PROCEDURE — 97162 PT EVAL MOD COMPLEX 30 MIN: CPT

## 2017-09-14 PROCEDURE — 82962 GLUCOSE BLOOD TEST: CPT

## 2017-09-14 PROCEDURE — 36415 COLL VENOUS BLD VENIPUNCTURE: CPT | Performed by: INTERNAL MEDICINE

## 2017-09-14 PROCEDURE — 74011250637 HC RX REV CODE- 250/637: Performed by: NURSE PRACTITIONER

## 2017-09-14 PROCEDURE — 74011636637 HC RX REV CODE- 636/637: Performed by: INTERNAL MEDICINE

## 2017-09-14 PROCEDURE — 74011250637 HC RX REV CODE- 250/637: Performed by: INTERNAL MEDICINE

## 2017-09-14 PROCEDURE — 51798 US URINE CAPACITY MEASURE: CPT

## 2017-09-14 PROCEDURE — 74011636637 HC RX REV CODE- 636/637: Performed by: HOSPITALIST

## 2017-09-14 PROCEDURE — 97166 OT EVAL MOD COMPLEX 45 MIN: CPT

## 2017-09-14 PROCEDURE — 77030011943

## 2017-09-14 PROCEDURE — 80048 BASIC METABOLIC PNL TOTAL CA: CPT | Performed by: INTERNAL MEDICINE

## 2017-09-14 PROCEDURE — 74011250637 HC RX REV CODE- 250/637: Performed by: HOSPITALIST

## 2017-09-14 PROCEDURE — 65660000000 HC RM CCU STEPDOWN

## 2017-09-14 RX ORDER — CARVEDILOL 6.25 MG/1
6.25 TABLET ORAL 2 TIMES DAILY WITH MEALS
Status: DISCONTINUED | OUTPATIENT
Start: 2017-09-14 | End: 2017-09-18 | Stop reason: HOSPADM

## 2017-09-14 RX ORDER — ISOSORBIDE MONONITRATE 30 MG/1
30 TABLET, EXTENDED RELEASE ORAL DAILY
Status: DISCONTINUED | OUTPATIENT
Start: 2017-09-14 | End: 2017-09-18 | Stop reason: HOSPADM

## 2017-09-14 RX ORDER — LISINOPRIL 5 MG/1
5 TABLET ORAL DAILY
Status: DISCONTINUED | OUTPATIENT
Start: 2017-09-14 | End: 2017-09-18 | Stop reason: HOSPADM

## 2017-09-14 RX ADMIN — ASPIRIN 81 MG 81 MG: 81 TABLET ORAL at 08:05

## 2017-09-14 RX ADMIN — Medication 10 ML: at 06:23

## 2017-09-14 RX ADMIN — LISINOPRIL 5 MG: 5 TABLET ORAL at 08:13

## 2017-09-14 RX ADMIN — TICAGRELOR 60 MG: 60 TABLET ORAL at 17:35

## 2017-09-14 RX ADMIN — ATORVASTATIN CALCIUM 40 MG: 40 TABLET, FILM COATED ORAL at 21:26

## 2017-09-14 RX ADMIN — Medication 5 ML: at 00:20

## 2017-09-14 RX ADMIN — INSULIN GLARGINE 12 UNITS: 100 INJECTION, SOLUTION SUBCUTANEOUS at 00:17

## 2017-09-14 RX ADMIN — ISOSORBIDE MONONITRATE 30 MG: 30 TABLET, EXTENDED RELEASE ORAL at 11:06

## 2017-09-14 RX ADMIN — DOCUSATE SODIUM 100 MG: 100 CAPSULE, LIQUID FILLED ORAL at 08:05

## 2017-09-14 RX ADMIN — INSULIN LISPRO 2 UNITS: 100 INJECTION, SOLUTION INTRAVENOUS; SUBCUTANEOUS at 17:29

## 2017-09-14 RX ADMIN — CARVEDILOL 6.25 MG: 6.25 TABLET, FILM COATED ORAL at 08:13

## 2017-09-14 RX ADMIN — POVIDONE-IODINE: 10 SOLUTION TOPICAL at 08:08

## 2017-09-14 RX ADMIN — TICAGRELOR 60 MG: 60 TABLET ORAL at 06:24

## 2017-09-14 RX ADMIN — CARVEDILOL 6.25 MG: 6.25 TABLET, FILM COATED ORAL at 17:28

## 2017-09-14 RX ADMIN — Medication 5 ML: at 21:26

## 2017-09-14 NOTE — PROGRESS NOTES
Bedside and Verbal shift change report given to María Soto (oncoming nurse) by Lauren Shafer (offgoing nurse). Report included the following information SBAR, Kardex, MAR and Recent Results.

## 2017-09-14 NOTE — PROGRESS NOTES
Care Management Interventions  Transition of Care Consult (CM Consult): Discharge Planning  Physical Therapy Consult: Yes  Occupational Therapy Consult: Yes  Current Support Network: Lives with Spouse     Patient will need a life vest at discharge per cardiology. I faxed over orders, H/P, progress notes, face sheet for the life vest. I attempted to see patient who was not good historian at answering questions. He states he lives with his wife. I will follow up with wife for discharge planning. PPD, PT/OT have been ordered.  CM following

## 2017-09-14 NOTE — PROGRESS NOTES
Problem: Self Care Deficits Care Plan (Adult)  Goal: *Acute Goals and Plan of Care (Insert Text)  1. Patient will feed self entire meal with modified independence and adaptive utensils as needed. 2. Patient will groom self with modified independence and adaptive equipment as needed. 3. Patient will complete full body dressing and bathing with SBA and adaptive equipment as needed. 4. Patient will participate in BUE therapeutic exercises to increase strength in LUE to at least 5/5 for participation in ADLs and functional transfers. 5. Patient will participate in [address] [place] [address] therapeutic activities to increase coordination in E to [facility] for bimanual fine motor ADLs. 6. Patient will complete toileting with supervision and least restrictive adaptive equipment. 7. Patient will complete functional transfers with supervision and adaptive equipment as needed. Timeframe: 7 visits     Comments:       OCCUPATIONAL THERAPY: Initial Assessment and AM 9/14/2017  INPATIENT: Hospital Day: 4  Payor: Lore Mix / Plan: [facility] HUMANA MEDICARE CHOICE PPO/PFFS / Product Type: [employer] Care Medicare /      NAME/AGE/GENDER: Masood Leon is a 67 y.o. male        PRIMARY DIAGNOSIS:  Syncope and collapse  Syncope and collapse  Syncope Syncope and collapse Syncope and collapse        ICD-10: Treatment Diagnosis:        · Generalized Muscle Weakness (M62.81)  · Other lack of cordination (R27.8)  · Repeated Falls (R29.6)  · History of falling (Z91.81)  · Hemiplegia and hemiparesis following cerebral infarction affecting left non-dominant side (C71.086)   Precautions/Allergies:         Review of patient's allergies indicates no known allergies. ASSESSMENT:      Mr. Jake Covarrubias presents to hospital for above. PMHx includes CVA with L-sided weakness and slurred speech. Pt is R hand dominant. Pt lives in a split level home with his wife, there are 4 ZAIDA.  He reports being independent, although his daughter corrects and admits he requires assistance at baseline with some ADLs. Pt has access to a rolling walker and a cane, but does not use either, although he could benefit from AD. They report 2-3 falls in the last 6 months. Today, pt is seated on EOB, having just completed PT evaluation. He demonstrates good static and fair dynamic sitting balance. Pt presents with decreased attention/concentration and command following and slow processing speed, thus formal MMT was difficult. Per attempt and visual screen, BUE are impaired with AROM/strength/coordination with the L revealing greater impairments than the right. He completes 2 STS, requiring CGA to stand and min A to lower, as pt just \"plops\" without lowering assistance. Mr. Britton Moralez is functioning below his baseline and requires continued skilled OT services aimed at maximizing safety and independence with ADLs. Will follow during acute stay. This section established at most recent assessment   PROBLEM LIST (Impairments causing functional limitations):  1. Decreased Strength  2. Decreased ADL/Functional Activities  3. Decreased Transfer Abilities  4. Decreased Ambulation Ability/Technique  5. Decreased Balance  6. Decreased Activity Tolerance  7. Increased Shortness of Breath  8. Decreased Flexibility/Joint Mobility  9. Decreased [place] with Home Exercise Program  10. Decreased Cognition    INTERVENTIONS PLANNED: (Benefits and precautions of occupational therapy have been discussed with the patient.)  1. Activities of daily living training  2. Adaptive equipment training  3. Balance training  4. Clothing management  5. Cognitive training  6. Donning&doffing training  7. Group therapy  8. Neuromuscular re-eduation  9. Therapeutic activity  10. Therapeutic exercise      TREATMENT PLAN: Frequency/Duration: Follow patient 3x/week to address above goals.   Rehabilitation Potential For Stated Goals: GOOD      RECOMMENDED REHABILITATION/EQUIPMENT: (at time of discharge pending progress): Due to the probability of continued deficits (see above) this patient will likely need continued skilled occupational therapy after discharge. Equipment:   · None at this time                      OCCUPATIONAL PROFILE AND HISTORY:   History of Present Injury/Illness (Reason for Referral):  See H&P  Past Medical History/Comorbidities:   Mr. Estefany Mathias  has a past medical history of CVA (cerebral infarction) (2010); Diabetes ([address] and HTN (hypertension). Mr. Estefany Mathias  has a past surgical history that includes cardiac surg procedure unlist.  Social History/Living Environment:   Home Environment: Private residence  # Steps to Enter: 4  Rails to Enter: Yes  Hand Rails : Right  One/Two Story Residence: Split level  # of Interior Steps: 1  Living Alone: No  Support Systems: Child(trevon), Spouse/Significant Other/Partner  Patient Expects to be Discharged to[de-identified] Unknown  Current DME Used/Available at Home: Cane, quad, Walker, rolling  Tub or Shower Type: Tub/Shower combination  Prior Level of Function/Work/Activity:  Requires assistance at baseline with ADLs per daughter   Personal Factors:          Sex:  male        Age:  67 y.o. Number of Personal Factors/Comorbidities that affect the Plan of Care: Expanded review of therapy/medical records (1-2):  MODERATE COMPLEXITY   ASSESSMENT OF OCCUPATIONAL PERFORMANCE[de-identified]   Activities of Daily Living:           Basic ADLs (From Assessment) Complex ADLs (From Assessment)   Basic ADL  Feeding: Setup  Oral Facial Hygiene/Grooming: Minimum assistance  Bathing: Moderate assistance  Upper Body Dressing: Setup  Lower Body Dressing:  Moderate assistance  Toileting: Minimum assistance Instrumental ADL  Meal Preparation: Moderate assistance  Homemaking: Maximum assistance  Medication Management: Supervision  Financial Management: Supervision   Grooming/Bathing/Dressing Activities of Daily Living     Cognitive Retraining  Safety/Judgement: Fall prevention;Home safety;Decreased awareness of need for safety;Decreased awareness of need for assistance                       Bed/Mat Mobility  Supine to Sit: Stand-by asssistance  Sit to Supine: Stand-by asssistance  Sit to Stand: Contact guard assistance  Bed to Chair: Contact guard assistance;Minimum assistance          Most Recent Physical Functioning:   Gross Assessment:  AROM: Generally decreased, functional  Strength: Generally decreased, functional               Posture:  Posture (WDL): Exceptions to WDL  Posture Assessment: Rounded shoulders  Balance:  Sitting: Impaired  Sitting - Static: Good (unsupported)  Sitting - Dynamic: Fair (occasional)  Standing: Impaired  Standing - Static: Fair  Standing - Dynamic : Fair Bed Mobility:  Supine to Sit: Stand-by asssistance  Sit to Supine: Stand-by asssistance  Wheelchair Mobility:     Transfers:  Sit to Stand: Contact guard assistance  Stand to Sit: Minimum assistance  Bed to Chair: Contact guard assistance;Minimum assistance                    Patient Vitals for the past 6 hrs:       BP BP Patient Position SpO2 Pulse   09/14/17 0845 122/71 At rest 99 % 96   09/14/17 0951 111/77 Sitting 98 % -        Mental Status  Neurologic State: Alert, Confused  Orientation Level: Oriented to person, Oriented to place, Oriented to situation, Disoriented to time  Cognition: Decreased attention/concentration, Decreased command following  Perception: Appears intact  Perseveration: No perseveration noted  Safety/Judgement: Fall prevention, Home safety, Decreased awareness of need for safety, Decreased awareness of need for assistance                               Physical Skills Involved:  1. Range of Motion  2. Balance  3. Strength  4. Activity Tolerance  5. Fine Motor Control  6. Gross Motor Control Cognitive Skills Affected (resulting in the inability to perform in a timely and safe manner):  1. Executive Function  2. Sustained Attention  3. Divided Attention  4. Expression Psychosocial Skills Affected:  1.  Habits/Routines   Number of elements that affect the Plan of Care: 5+:  HIGH COMPLEXITY   CLINICAL DECISION MAKIN[address] [place] [id number] AM-PAC 6 Clicks   Daily Activity Inpatient Short Form  How much help from another person does the patient currently need. .. Total A Lot A Little None   1. Putting on and taking off regular lower body clothing?   [ ] 1   [X] 2   [ ] 3   [ ] 4   2. Bathing (including washing, rinsing, drying)? [ ] 1   [X] 2   [ ] 3   [ ] 4   3. Toileting, which includes using toilet, bedpan or urinal?   [ ] 1   [ ] 2   [X] 3   [ ] 4   4. Putting on and taking off regular upper body clothing?   [ ] 1   [ ] 2   [ ] 3   [X] 4   5. Taking care of personal grooming such as brushing teeth? [ ] 1   [ ] 2   [X] 3   [ ] 4   6. Eating meals? [ ] 1   [ ] 2   [ ] 3   [X] 4   © , Trustees of [address] [place] [id number], under license to [employer]. All rights reserved    Score:  Initial: 18 Most Recent: X (Date: -- )     Interpretation of Tool:  Represents activities that are increasingly more difficult (i.e. Bed mobility, Transfers, Gait). Score 24 23 22-20 19-15 14-10 9-7 6       Modifier CH CI CJ CK CL CM CN         · Self Care:               - CURRENT STATUS:    CK - 40%-59% impaired, limited or restricted               - GOAL STATUS:           CJ - 20%-39% impaired, limited or restricted               - D/C STATUS:                       ---------------To be determined---------------  Payor: HUMANA MEDICARE / Plan: [facility] HUMANA MEDICARE CHOICE PPO/PFFS / Product Type: Managed Care Medicare /       Medical Necessity:     · Patient is expected to demonstrate progress in strength, balance, coordination and functional technique to decrease assistance required with ADLs. Reason for Services/Other Comments:  · Patient continues to require skilled intervention due to medical complications.    Use of outcome tool(s) and clinical judgement create a POC that gives a: MODERATE COMPLEXITY             TREATMENT:   (In addition to Assessment/Re-Assessment sessions the following treatments were rendered)      Pre-treatment Symptoms/Complaints:    Pain: Initial:   Pain Intensity 1: 0  Post Session:  same      Assessment/Reassessment only, no treatment provided today     Braces/Orthotics/Lines/Etc:   · O2 Device: Room air  Treatment/Session Assessment:    · Response to Treatment:  eval only  · Interdisciplinary Collaboration:  · Physical Therapist  · Occupational Therapist  · Registered Nurse  · After treatment position/precautions:  · Supine in bed  · Bed alarm/tab alert on  · Bed/Chair-wheels locked  · Bed in low position  · Call light within reach  · RN notified  · Family at bedside  · Side rails x 3  · Compliance with Program/Exercises: compliant all of the time. · Recommendations/Intent for next treatment session: \"Next visit will focus on advancements to more challenging activities and reduction in assistance provided\".   Total Treatment Duration:  OT Patient Time In/Time Out  Time In: 1016  Time Out: Susanna [address]avar[address].

## 2017-09-14 NOTE — PROGRESS NOTES
Bedside and Verbal shift change report given to Veronique (oncoming nurse) by María Soto (offgoing nurse). Report included the following information SBAR, Kardex, MAR and Recent Results.

## 2017-09-14 NOTE — PROGRESS NOTES
Late entry. 1920--shift report received. Patient only responding to sternal rub. No purposeful response, no verbal response. VSS. 1940---charge nurse bolivar made aware. Patient beginning to wake up; very violent, combative. Patient disoriented and unable to comply with bed rest orders. Keeps sitting up and moving. Patient has R groin site and L radial site. Unable to be verbally calmed and reoriented. 2010--despite multiple nurses who have been familiar with the patient attempting to reorient and working to calm the patient patient Ultimately had to be restrained, in three points--R leg, and bilateral arms. 2025-- on call paged, notified.

## 2017-09-14 NOTE — PROGRESS NOTES
Pt resting supine and has not been trying to get oob for about 30 minutes. Continue frequent rounds.

## 2017-09-14 NOTE — PROGRESS NOTES
Late entry      2200--Spoke with batool julio NP. Still restless, agitated, and uncooperative. Unable to be verbally calmed. ---new orders received    2230--patient able to be talked down with assistance from several nurses. . Back to resting comfortably. 2250--Patient again unable to be calmed. Returned restless, agitated state. Haldol given.

## 2017-09-14 NOTE — PROGRESS NOTES
Problem: Falls - Risk of  Goal: *Absence of Falls  Document Jac Fall Risk and appropriate interventions in the flowsheet. Outcome: Progressing Towards Goal  Pt progressing towards goal. No falls since admission. Bed low and locked. Call light within reach. Side rails x 2. Gripper socks applied. Personal belongings within reach. Pt verbalizes understanding to call for assistance.          Fall Risk Interventions:  Mobility Interventions: Patient to call before getting OOB           Medication Interventions: Patient to call before getting OOB     Elimination Interventions: Patient to call for help with toileting needs     History of Falls Interventions: Bed/chair exit alarm

## 2017-09-14 NOTE — PROGRESS NOTES
0200--restless agitated. Stating that he needs to pee. Patient has had difficulty using the urinal tonight. Elimination has been source of much angst tonight. Patient has been incontinent several times. Even after using the urinal, he states that he needs to pee. Bladder scan patient. 475ml. 0230--orders to straight cath patient, per Tiffanie Tao, NP. 525ml out. While doing this, patient becomes extremely agitated, combative again. Patient able to be settled after the 525mls out, stating that he feels better. 0250--spent 20 minutes with patient, trying to keep him calm, reorienting the patient. Eventually he returns to resting state.

## 2017-09-14 NOTE — PROGRESS NOTES
[facility] CARDIOLOGY PROGRESS NOTE           9/14/2017 7:57 AM    Admit Date: 9/11/2017      Subjective: Tachycardic, noted EF on echo --needs lifevest placed prior to discharge. ROS:  Cardiovascular:  As noted above    Objective:      Vitals:    09/13/17 1837 09/13/17 2112 09/14/17 0058 09/14/17 0447   BP: 115/67 132/76 (!) 88/57 158/87   Pulse: 86 79 84 100   Resp: 16 18 18 18   Temp:  97.5 °F (36.4 °C) 97.8 °F (36.6 °C) 98.7 °F (37.1 °C)   SpO2: 98% 100% 100% 100%   Weight:    63 kg (139 lb)   Height:           Physical Exam:  General-No Acute Distress  Neck- supple, no JVD  CV- regular, tachycardic   Lung- clear bilaterally  Abd- soft, nontender, nondistended  Ext- no edema bilaterally. Skin- warm and dry    Data Review:   Recent Labs      09/14/17   0409  09/13/17   1943  09/12/17   0700  09/12/17   0135  09/11/17   1926  09/11/17   1619   NA  142  139   --    --    --   138   K  3.9  3.6   --    --    --   4.4   MG   --    --    --    --   1.8   --    BUN  11  12   --    --    --   17   CREA  1.00  0.93   --    --    --   1.38   GLU  119*  112*   --    --    --   541*   WBC   --   4.9   --    --    --   6.2   HGB   --   12.0*   --    --    --   13.8   HCT   --   33.9*   --    --    --   38.2*   PLT   --   161   --    --    --   179   TROIQ   --    --   1.37*  1.14*  0.31*  0.19*       Assessment/Plan:     Principal Problem:    Syncope and collapse (9/11/2017)  No arrhythmias noted on telemetry. Cath yesterday with attempted intervention to LAD not successful     Active Problems:    HTN (hypertension) (10/10/2015)  Will stop norvasc, start coreg and lisinopril given low EF, CAD      DM (diabetes mellitus) ([address] (10/10/2015)  Per IM       Precordial pain (9/11/2017)          Hx of stroke without residual deficits (9/11/2017)        YULY (acute kidney injury) ([facility] (9/11/2017)  Resolved. Will need close follow up with addition of ACE.  BMP in one week      Chronic systolic CHF (congestive heart failure) ([facility] (9/11/2017)  As above. Lifevest ordered. Will need to be placed prior to discharge. Coronary artery disease involving coronary bypass graft (9/11/2017)  As above.        Noncompliance (9/11/2017)        Syncope (9/13/2017)            Clemente Murray NP  9/14/2017 7:57 AM

## 2017-09-14 NOTE — PROGRESS NOTES
Bedside and Verbal shift change report given to self (oncoming nurse) by [place] (offgoing nurse). Report included the following information SBAR, Kardex, MAR and Recent Results.

## 2017-09-14 NOTE — PROGRESS NOTES
imdur given and med education reviewed with pt and daughter including side effects and symptoms to report.

## 2017-09-14 NOTE — PROCEDURES
Deepali Mcdonald 43       Name:  China Gavin   MR#:  [id number]   :  1940   Account #:  [de-identified]   Date of Adm:  2017       PROCEDURES PERFORMED:   1. Cardiac catheterization. 2. Attempted percutaneous coronary intervention left anterior   descending. HISTORY: This is a 41-year-old gentleman with coronary disease   and prior bypass. He is admitted with syncope and chest pain. His troponins were low level positive. A cardiac catheterization   was recommended. PROCEDURE: Left heart catheterization, left ventriculography,   and coronary angiography is carried out from the right femoral   artery by modified Seldinger technique with a 5-Polish pigtail,   #4 left Wilder, #4 right Wilder, multipurpose and IM curved   catheter. Based on his history and angiographic findings, it was   decided to proceed with attempted angioplasty of the LAD via the   saphenous vein graft. The Angiomax was the anticoagulant. The   guide catheter was a 6-Polish AL1. Wiring the vessel was very   difficult due to the small nature of the vessel and his diffuse   disease. Once it was wired not even a 1.25 balloon would not   pass into the LAD even with maximal GuideLiner, deep throat   support. There was some distal wire dissection noted. This   was asymptomatic. No further attempts at angioplasty   were performed. At the end of the procedure, this sheath was   removed and Perclose was deployed with good hemostasis. FINDINGS: The central aortic pressure is 120/70 mmHg. Left   ventricular end-diastolic pressure is 10 mmHg. There is no   gradient on pullback across the aortic valve. The left ventricle is normal size. There is severe generalized   left ventricular hypokinesis. The inferior apex is akinetic. The   ejection fraction appears to be on the order of 30% to 35%. Coronary angiography reveals an occluded left main. The native right is occluded.       The saphenous vein graft to the LAD is patent. There is a 30%   eccentric proximal stenosis. The rest of the vein graft looks   pretty good. It inserts into a calcified, diffusely diseased mid   LAD segment. There is some retrograde filling into a more   moderate diagonal branch which has some proximal disease. The   LAD distal to its insertion is small caliber with multiple areas   of high-grade stenosis and distal total occlusion. The LIMA graft to the left circumflex is patent and normal with   good runoff into the large obtuse marginal branch circulation. Saphenous vein graft to the native right coronary artery is   occluded. The distal right coronary artery fills faintly by   collateral flow from the left coronary artery. Post-attempted balloon angioplasty of the LAD, there is no   significant change. There is TANG-3 flow. IMPRESSION:   1. Severe left ventricular dysfunction. 2. Severe coronary artery disease as described. Both the native   left and right coronary arteries are occluded. 3. Graft findings as described. The vein graft to the LAD is   patent. It has moderate eccentric proximal disease. The visualized middle  and distal lad segments are small caliber, calcified and severely and diffusely   diseased. The LIMA graft to the occluded left circumflex is patent with good   runoff. The vein graft to the right coronary artery is occluded. 4.  Unsuccessful attempted PCI to the LAD via saphenous vein   graft. PLAN: Continue medical therapy. EP will be consulted to consider   prophylactic ICD implantation.         Lisa Cedillo MD      [facility].   D:  09/13/2017   18:53   T:  09/14/2017   03:41   Job #:  [id number]

## 2017-09-14 NOTE — PROGRESS NOTES
Met with patient wife and daughter and discussed discharge planning. Patient is confused at times and weak per family and they request SNF at discharge. Provided copy of SNF list to them and they chose 1. [facility], 2. [facility] [place] 3. [facility] and 4. [facility] Referral placed through [facility] and [facility]. Will await bed offers.

## 2017-09-14 NOTE — PROGRESS NOTES
Problem: Mobility Impaired (Adult and Pediatric)  Goal: *Acute Goals and Plan of Care (Insert Text)  STG:  (1.)Mr. Orville Gorman will move from supine to sit and sit to supine , scoot up and down and roll side to side with SUPERVISION within 3 day(s). (2.)Mr. Orville Gorman will transfer from bed to chair and chair to bed with CONTACT GUARD ASSIST using the least restrictive device within 3 day(s). (3.)Mr. Orville Gorman will ambulate with CONTACT GUARD ASSIST for 50 feet with the least restrictive device within 3 day(s). (4.)Mr. Orville Gorman will participate in therapeutic activity/exerices x 12 minutes for increased strength within 3 days. LTG:  (1.)Mr. Orville Gorman will move from supine to sit and sit to supine , scoot up and down and roll side to side in bed with MODIFIED INDEPENDENCE within 7 day(s). (2.)Mr. Orville Gorman will transfer from bed to chair and chair to bed with SUPERVISION using the least restrictive device within 7 day(s). (3.)Mr. Orville Gorman will ambulate with SUPERVISION for 75 feet with the least restrictive device within 7 day(s). (4.)Mr. Orville Gorman will participate in therapeutic activity/exerices x 23 minutes for increased strength within 7 days. ________________________________________________________________________________________________      PHYSICAL THERAPY: INITIAL ASSESSMENT, AM 9/14/2017  INPATIENT: Hospital Day: 4  Payor: Molly Rudd / Plan: [facility] HUMANA MEDICARE CHOICE PPO/PFFS / Product Type: [employer] Medicare /      NAME/AGE/GENDER: Miguelina Vyas is a 67 y.o. male        PRIMARY DIAGNOSIS: Syncope and collapse  Syncope and collapse  Syncope Syncope and collapse Syncope and collapse        ICD-10: Treatment Diagnosis:       · Generalized Muscle Weakness (M62.81)  · Difficulty in walking, Not elsewhere classified (R26.2)  · Other abnormalities of gait and mobility (R26.89)  · History of falling (Z91.81)   Precaution/Allergies:  Review of patient's allergies indicates no known allergies.        ASSESSMENT:       Orville Sherif is a pleasant but confused 67 y.o. male in the hospital for the above that occurred at a grocery store. Pt has a PMH of CVA with apparent residual dysphagia and is difficult to understand. Pt's daughter was present and assist with providing history for pt. She reports that her father lives with his wife in a split level home with 4 steps to enter. She also stated that he occasionally walks with a quad cane and has a history of falling. Per daughter pt also struggles with ADLs, especially getting out of tub. Mr. Kamila Krueger reports that he using a riding  to cut neighbors grass and recently sustained injury to R toe after he ran over his foot with it. Mr. Kamila Krueger presents to PT with generally decreased strength and AROM in B LEs. He reports no numbness or tingling but has decreased L knee extension PROM. Pt struggles with concentration and command following while being disoriented to month/year. He was able to come to sitting on edge of bed with SBA and mod cuing. He then stood with Whitney and ambulated in chan with [address]. Pt reported feeling dizzy and chair was pulled up for pt to rest and to record vitals (which were stable). Mr. Kamila Krueger appears to be functioning below his baseline and could benefit from skilled PT. This section established at most recent assessment   PROBLEM LIST (Impairments causing functional limitations):  1. Decreased Strength  2. Decreased ADL/Functional Activities  3. Decreased Transfer Abilities  4. Decreased Ambulation Ability/Technique  5. Decreased Balance  6. Decreased Activity Tolerance  7. Decreased Cognition    INTERVENTIONS PLANNED: (Benefits and precautions of physical therapy have been discussed with the patient.)  1. Balance Exercise  2. Bed Mobility  3. Family Education  4. Gait Training  5. Neuromuscular Re-education/Strengthening  6. Therapeutic Activites  7. Therapeutic Exercise/Strengthening  8. Transfer Training  9.  Group Therapy      TREATMENT PLAN: Frequency/Duration: 3 times a week for duration of hospital stay  Rehabilitation Potential For Stated Goals: GOOD      RECOMMENDED REHABILITATION/EQUIPMENT: (at time of discharge pending progress): Due to the probability of continued deficits (see above) this patient will likely need continued skilled physical therapy after discharge. Equipment:   · None at this time                   HISTORY:   History of Present Injury/Illness (Reason for Referral):  Per H&P:    HPI:   Mr. Radha Moreira is a 68 yo M with PMHx of CAD s/p CABG x2 (1st one when he was 34 yo), chronic systolic CHF with EF 41% in 10/10/2015 followed by Dr. Alexia Bautista MD, IDDM on novolog Mix 70/30 12 UI BID, HTN, Hx of recurrent CVA on chronic eliquis, hx of noncompliance with medication presented to [facility] brought by EMS after he had a syncopal event while he was doing his groceries. Unknown how log LOC was. Unknown if head trauma. States that prior to that he had severe chest pain in midsternal area 7/10 , w/o radiation for  approx 6-0 minutes. Received  mg  And 1 L NS per EMS. Mr. Radha Moreira states that he didn't took his insulin for florence=porx 10 days. He states that he didn't took his other medications either, although family at bedside states that he is taking only his eliquis. Complains of polyuria and polydipsia in the last week. In ED WBC:6.2, BS:541, Cr:1.38, Trop I:0.19, EKG with old LBBB, sinus tach with fusion complexes. 10+ point ROS done and is negative except as noted in HPI. Past Medical History/Comorbidities:   Mr. Radha Moreira  has a past medical history of CVA (cerebral infarction) (2010); Diabetes ([address] and HTN (hypertension).   Mr. Radha Moreira  has a past surgical history that includes cardiac surg procedure unlist.  Social History/Living Environment:   Home Environment: Private residence  # Steps to Enter: 4  Rails to Enter: Yes  Hand Rails : Right  One/Two Story Residence: Split level  # of Interior Steps: 1  Living Alone: No  Support Systems: Spouse/Significant Other/Partner  Patient Expects to be Discharged to[de-identified] Unknown  Current DME Used/Available at Home: Walker, rolling, Cane, quad  Tub or Shower Type: Tub/Shower combination  Prior Level of Function/Work/Activity:  Lives with wife in split level home with steps to enter. Reports that he is independent with ADLs (which daughter contradicted) and uses a quad cane for ambulation (daughter reported he forget to). Also has had several recent falls. Number of Personal Factors/Comorbidities that affect the Plan of Care:  CVA  DM  Falls 3+: HIGH COMPLEXITY   EXAMINATION:   Most Recent Physical Functioning:   Gross Assessment:  AROM: Generally decreased, functional  PROM: Generally decreased, functional (L knee extension)  Strength: Generally decreased, functional (grossly 3-/5)               Posture:  Posture (WDL): Exceptions to WDL  Posture Assessment: Rounded shoulders  Balance:  Sitting: Impaired  Sitting - Static: Good (unsupported)  Sitting - Dynamic: Fair (occasional)  Standing: Impaired  Standing - Static: Fair  Standing - Dynamic : Fair (-) Bed Mobility:  Supine to Sit: Stand-by asssistance  Wheelchair Mobility:     Transfers:  Sit to Stand: Minimum assistance  Stand to Sit: Contact guard assistance  Bed to Chair: Contact guard assistance;Minimum assistance  Gait:     Base of Support: Narrowed  Speed/Mirta: Slow;Shuffled  Step Length: Right shortened;Left shortened  Gait Abnormalities: Decreased step clearance;Shuffling gait  Distance (ft): 20 Feet (ft) (x 2)  Assistive Device:  (HHA)  Ambulation - Level of Assistance: Contact guard assistance;Minimal assistance       Body Structures Involved:  1. Nerves  2. Heart  3. Muscles Body Functions Affected:  1. Mental  2. Cardio  3. Neuromusculoskeletal  4. Movement Related Activities and Participation Affected:  1. Learning and Applying Knowledge  2. General Tasks and Demands  3. Communication  4. Mobility  5. Self Care  6.  Domestic Life  7. Community, Social and [place]   Number of elements that affect the Plan of Care: 4+: HIGH COMPLEXITY   CLINICAL PRESENTATION:   Presentation: Evolving clinical presentation with changing clinical characteristics: MODERATE COMPLEXITY   CLINICAL DECISION MAKIN [facility] Mobility Inpatient Short Form  How much difficulty does the patient currently have. .. Unable A Lot A Little None   1. Turning over in bed (including adjusting bedclothes, sheets and blankets)? [ ] 1   [ ] 2   [ ] 3   [X] 4   2. Sitting down on and standing up from a chair with arms ( e.g., wheelchair, bedside commode, etc.)   [ ] 1   [ ] 2   [X] 3   [ ] 4   3. Moving from lying on back to sitting on the side of the bed? [ ] 1   [ ] 2   [X] 3   [ ] 4   How much help from another person does the patient currently need. .. Total A Lot A Little None   4. Moving to and from a bed to a chair (including a wheelchair)? [ ] 1   [ ] 2   [X] 3   [ ] 4   5. Need to walk in hospital room? [ ] 1   [ ] 2   [X] 3   [ ] 4   6. Climbing 3-5 steps with a railing? [ ] 1   [ ] 2   [X] 3   [ ] 4   © , Trustees of [address] [place] [address] [address] [id number], under license to [employer]. All rights reserved    Score:  Initial: 19 Most Recent: X (Date: -- )     Interpretation of Tool:  Represents activities that are increasingly more difficult (i.e. Bed mobility, Transfers, Gait).        Score 24 23 22-20 19-15 14-10 9-7 6       Modifier CH CI CJ CK CL CM CN         · Mobility - Walking and Moving Around:               - CURRENT STATUS:    CK - 40%-59% impaired, limited or restricted               - GOAL STATUS:           CJ - 20%-39% impaired, limited or restricted               - D/C STATUS:                       ---------------To be determined---------------  Payor: HUMANA MEDICARE / Plan: [facility] HUMANA MEDICARE CHOICE PPO/PFFS / Product Type: Managed Care Medicare /       Medical Necessity:     · Patient demonstrates good rehab potential due to higher previous functional level. Reason for Services/Other Comments:  · Patient continues to require skilled intervention due to decreased balance, ambulation, and functional mobility. Use of outcome tool(s) and clinical judgement create a POC that gives a: Questionable prediction of patient's progress: MODERATE COMPLEXITY                 TREATMENT:   (In addition to Assessment/Re-Assessment sessions the following treatments were rendered)   Pre-treatment Symptoms/Complaints:  Confusion  Pain: Initial:   Pain Intensity 1: 0  Post Session:  0      Assessment/Reassessment only, no treatment provided today     Braces/Orthotics/Lines/Etc:   · O2 Device: Room air  Treatment/Session Assessment:    · Response to Treatment:  Pt tolerated fairly given decreased cognitive status and dizziness with walking. · Interdisciplinary Collaboration:  · Physical Therapist  · Occupational Therapist  · Registered Nurse  ·   · Certified Nursing Assistant/Patient Care Technician  · After treatment position/precautions:  · Bed/Chair-wheels locked  · Bed in low position  · Family at bedside  · Sitting on edge of bed with OT present  · Compliance with Program/Exercises: Will assess as treatment progresses. · Recommendations/Intent for next treatment session: \"Next visit will focus on advancements to more challenging activities and reduction in assistance provided\".   Total Treatment Duration:  PT Patient Time In/Time Out  Time In: 0951  Time Out: [address] Mikala Yates DPT

## 2017-09-14 NOTE — PROGRESS NOTES
Pt moved to 322 closer to RN station as pt continues to attempt to get out of bed despite frequent toileting rounds and reorientation. Pt's wife and daughter report that they have to go and need rest.    Bed alarm in place, 3 side rails, continue frequently offering urinal, door left open-very close to RN station, charge RN, Blanca Suggs aware, [facility] Cardiology Win Enamorado NP and Baldomero Miranda NP aware of safety concern.

## 2017-09-14 NOTE — PROGRESS NOTES
0400--patient resting comfortably. RL restraint released. So far tolerating well.    0600--patient resting comfortably. Bilateral arms released. Patient calm, cooperative. Bed alarm on. Call light in reach.

## 2017-09-15 LAB
GLUCOSE BLD STRIP.AUTO-MCNC: 119 MG/DL (ref 65–100)
GLUCOSE BLD STRIP.AUTO-MCNC: 142 MG/DL (ref 65–100)
GLUCOSE BLD STRIP.AUTO-MCNC: 159 MG/DL (ref 65–100)
GLUCOSE BLD STRIP.AUTO-MCNC: 344 MG/DL (ref 65–100)
MM INDURATION POC: 0 MM (ref 0–5)
PPD POC: NEGATIVE NEGATIVE

## 2017-09-15 PROCEDURE — 74011250637 HC RX REV CODE- 250/637: Performed by: HOSPITALIST

## 2017-09-15 PROCEDURE — 74011250637 HC RX REV CODE- 250/637: Performed by: NURSE PRACTITIONER

## 2017-09-15 PROCEDURE — 74011250637 HC RX REV CODE- 250/637: Performed by: INTERNAL MEDICINE

## 2017-09-15 PROCEDURE — 65660000000 HC RM CCU STEPDOWN

## 2017-09-15 PROCEDURE — 82962 GLUCOSE BLOOD TEST: CPT

## 2017-09-15 PROCEDURE — 74011636637 HC RX REV CODE- 636/637: Performed by: HOSPITALIST

## 2017-09-15 PROCEDURE — 74011000250 HC RX REV CODE- 250: Performed by: HOSPITALIST

## 2017-09-15 RX ORDER — INSULIN LISPRO 100 [IU]/ML
INJECTION, SOLUTION INTRAVENOUS; SUBCUTANEOUS
Qty: 1 VIAL | Refills: 0 | Status: SHIPPED
Start: 2017-09-15

## 2017-09-15 RX ADMIN — ASPIRIN 81 MG 81 MG: 81 TABLET ORAL at 08:55

## 2017-09-15 RX ADMIN — INSULIN LISPRO 8 UNITS: 100 INJECTION, SOLUTION INTRAVENOUS; SUBCUTANEOUS at 16:17

## 2017-09-15 RX ADMIN — CARVEDILOL 6.25 MG: 6.25 TABLET, FILM COATED ORAL at 08:55

## 2017-09-15 RX ADMIN — INSULIN LISPRO 2 UNITS: 100 INJECTION, SOLUTION INTRAVENOUS; SUBCUTANEOUS at 08:57

## 2017-09-15 RX ADMIN — ACETAMINOPHEN 650 MG: 325 TABLET, FILM COATED ORAL at 10:15

## 2017-09-15 RX ADMIN — Medication 10 ML: at 05:44

## 2017-09-15 RX ADMIN — APIXABAN 5 MG: 5 TABLET, FILM COATED ORAL at 20:48

## 2017-09-15 RX ADMIN — Medication 5 ML: at 20:51

## 2017-09-15 RX ADMIN — DOCUSATE SODIUM 100 MG: 100 CAPSULE, LIQUID FILLED ORAL at 08:55

## 2017-09-15 RX ADMIN — CARVEDILOL 6.25 MG: 6.25 TABLET, FILM COATED ORAL at 16:17

## 2017-09-15 RX ADMIN — TICAGRELOR 60 MG: 60 TABLET ORAL at 06:05

## 2017-09-15 RX ADMIN — ISOSORBIDE MONONITRATE 30 MG: 30 TABLET, EXTENDED RELEASE ORAL at 08:56

## 2017-09-15 RX ADMIN — LISINOPRIL 5 MG: 5 TABLET ORAL at 08:55

## 2017-09-15 RX ADMIN — Medication 5 ML: at 12:37

## 2017-09-15 RX ADMIN — APIXABAN 5 MG: 5 TABLET, FILM COATED ORAL at 08:55

## 2017-09-15 RX ADMIN — ATORVASTATIN CALCIUM 40 MG: 40 TABLET, FILM COATED ORAL at 20:48

## 2017-09-15 RX ADMIN — POVIDONE-IODINE: 10 SOLUTION TOPICAL at 09:01

## 2017-09-15 NOTE — PROGRESS NOTES
Patient has received bed offer from Ricky at [facility]. I will contact family and let them know and wait for pre-certification. Also left message for Cheryl Stern for [employer] ie life vest after notifying Silvestre Romero that they did not place life vest as patient could not do the vest yesterday and per Geovany Alberto he needs the vest.     Spoke to patients wife earlier and she agreed to accept bed from [facility] and we are waiting on pre-cert from [facility]. Notified Jefn More of bed offer and awaiting precert. CM following for discharge to SNF. Per Rk Medina admission coordinator at [facility] still has not heard from [facility]. I had also called Doris at [facility] to request assistance in obtaining precert. Patient unable to transfer to SNF until [facility] approves.

## 2017-09-15 NOTE — PROGRESS NOTES
Patient BS was 61, orange juice provided and BS 15 min later was 83. LISETH Palma notified orders to hold Lantus tonight and consult hospitalist tomorrow for diabetes control .

## 2017-09-15 NOTE — PROGRESS NOTES
Noted consult fo BG management. Patient has been seen by hospitalist during this course for same. Hypoglycemic last PM to 61 (reportedly not eating well yesterday but better today). BG this AM of 142. Will recommend continuing to hold Lantus dosing and continue sliding scale at discharge to rehab. No charge to patient today.

## 2017-09-15 NOTE — PROGRESS NOTES
9/15/2017 6:04 PM    Admit Date: 9/11/2017    Admit Diagnosis: Syncope and collapse;Syncope and collapse;Syncope      Subjective:   No cp or sob      Objective:      Visit Vitals    /43 (BP Patient Position: At rest)    Pulse 67    Temp 98.3 °F (36.8 °C)    Resp 16    Ht 5' 11\" (1.803 m)    Wt 59 kg (130 lb)    SpO2 97%    BMI 18.13 kg/m2       Physical Exam:  [facility], Well Nourished, No Acute Distress, Alert & Oriented x 3, appropriate mood. Neck- supple, no JVD  CV- regular rate and rhythm no MRG  Lung- clear bilaterally  Abd- soft, nontender, nondistended  Ext- no edema bilaterally. Skin- warm and dry        Data Review:   Recent Labs      09/14/17   0409  09/13/17 1943   NA  142  139   K  3.9  3.6   BUN  11  12   CREA  1.00  0.93   WBC   --   4.9   HGB   --   12.0*   HCT   --   33.9*   PLT   --   161       Assessment/Plan:     Principal Problem:    Syncope and collapse (9/11/2017)- to rehab with life vest- follow up with ep for icd consideration    Active Problems:    HTN (hypertension) (10/10/2015)      DM (diabetes mellitus) ([facility] (10/10/2015)      Precordial pain (9/11/2017)      Hx of stroke without residual deficits (9/11/2017)      YULY (acute kidney injury) ([facility] (9/11/2017)      Chronic systolic CHF (congestive heart failure) ([facility] (9/11/2017)Stable. Continue current medical therapy. Coronary artery disease involving coronary bypass graft (9/11/2017)Stable. Continue current medical therapy.   Medical therapy but with eliquis will stop brilinta since no stent placed      Noncompliance (9/11/2017)      Syncope (9/13/2017)

## 2017-09-15 NOTE — PROGRESS NOTES
Verbal bedside report given to oncoming RN, Issa Jacobo. Patient's situation, background, assessment and recommendations provided. Opportunity for questions provided. Oncoming RN assumed care of patient.

## 2017-09-16 LAB
BASOPHILS # BLD: 0 K/UL (ref 0–0.2)
BASOPHILS NFR BLD: 0 % (ref 0–2)
DIFFERENTIAL METHOD BLD: ABNORMAL
EOSINOPHIL # BLD: 0.3 K/UL (ref 0–0.8)
EOSINOPHIL NFR BLD: 5 % (ref 0.5–7.8)
ERYTHROCYTE [DISTWIDTH] IN BLOOD BY AUTOMATED COUNT: 13.1 % (ref 11.9–14.6)
GLUCOSE BLD STRIP.AUTO-MCNC: 136 MG/DL (ref 65–100)
GLUCOSE BLD STRIP.AUTO-MCNC: 231 MG/DL (ref 65–100)
GLUCOSE BLD STRIP.AUTO-MCNC: 236 MG/DL (ref 65–100)
GLUCOSE BLD STRIP.AUTO-MCNC: 291 MG/DL (ref 65–100)
HCT VFR BLD AUTO: 34.5 % (ref 41.1–50.3)
HGB BLD-MCNC: 12.1 G/DL (ref 13.6–17.2)
IMM GRANULOCYTES # BLD: 0 K/UL (ref 0–0.5)
IMM GRANULOCYTES NFR BLD: 0.2 % (ref 0–5)
LYMPHOCYTES # BLD: 1.5 K/UL (ref 0.5–4.6)
LYMPHOCYTES NFR BLD: 30 % (ref 13–44)
MCH RBC QN AUTO: 29.6 PG (ref 26.1–32.9)
MCHC RBC AUTO-ENTMCNC: 35.1 G/DL (ref 31.4–35)
MCV RBC AUTO: 84.4 FL (ref 79.6–97.8)
MONOCYTES # BLD: 0.4 K/UL (ref 0.1–1.3)
MONOCYTES NFR BLD: 8 % (ref 4–12)
NEUTS SEG # BLD: 2.8 K/UL (ref 1.7–8.2)
NEUTS SEG NFR BLD: 57 % (ref 43–78)
PLATELET # BLD AUTO: 160 K/UL (ref 150–450)
PMV BLD AUTO: 11.7 FL (ref 10.8–14.1)
RBC # BLD AUTO: 4.09 M/UL (ref 4.23–5.67)
WBC # BLD AUTO: 4.9 K/UL (ref 4.3–11.1)

## 2017-09-16 PROCEDURE — 74011250637 HC RX REV CODE- 250/637: Performed by: INTERNAL MEDICINE

## 2017-09-16 PROCEDURE — 74011250637 HC RX REV CODE- 250/637: Performed by: NURSE PRACTITIONER

## 2017-09-16 PROCEDURE — 82962 GLUCOSE BLOOD TEST: CPT

## 2017-09-16 PROCEDURE — 65660000000 HC RM CCU STEPDOWN

## 2017-09-16 PROCEDURE — 85025 COMPLETE CBC W/AUTO DIFF WBC: CPT | Performed by: INTERNAL MEDICINE

## 2017-09-16 PROCEDURE — 74011636637 HC RX REV CODE- 636/637: Performed by: HOSPITALIST

## 2017-09-16 PROCEDURE — 97530 THERAPEUTIC ACTIVITIES: CPT

## 2017-09-16 PROCEDURE — 74011250637 HC RX REV CODE- 250/637: Performed by: HOSPITALIST

## 2017-09-16 PROCEDURE — 36415 COLL VENOUS BLD VENIPUNCTURE: CPT | Performed by: INTERNAL MEDICINE

## 2017-09-16 RX ADMIN — APIXABAN 5 MG: 5 TABLET, FILM COATED ORAL at 08:57

## 2017-09-16 RX ADMIN — INSULIN LISPRO 4 UNITS: 100 INJECTION, SOLUTION INTRAVENOUS; SUBCUTANEOUS at 12:44

## 2017-09-16 RX ADMIN — LISINOPRIL 5 MG: 5 TABLET ORAL at 08:58

## 2017-09-16 RX ADMIN — Medication 10 ML: at 18:18

## 2017-09-16 RX ADMIN — DOCUSATE SODIUM 100 MG: 100 CAPSULE, LIQUID FILLED ORAL at 08:57

## 2017-09-16 RX ADMIN — Medication 5 ML: at 21:21

## 2017-09-16 RX ADMIN — ASPIRIN 81 MG 81 MG: 81 TABLET ORAL at 08:57

## 2017-09-16 RX ADMIN — CARVEDILOL 6.25 MG: 6.25 TABLET, FILM COATED ORAL at 08:58

## 2017-09-16 RX ADMIN — ATORVASTATIN CALCIUM 40 MG: 40 TABLET, FILM COATED ORAL at 21:20

## 2017-09-16 RX ADMIN — POVIDONE-IODINE: 10 SOLUTION TOPICAL at 08:59

## 2017-09-16 RX ADMIN — INSULIN LISPRO 4 UNITS: 100 INJECTION, SOLUTION INTRAVENOUS; SUBCUTANEOUS at 16:30

## 2017-09-16 RX ADMIN — INSULIN LISPRO 6 UNITS: 100 INJECTION, SOLUTION INTRAVENOUS; SUBCUTANEOUS at 22:10

## 2017-09-16 RX ADMIN — APIXABAN 5 MG: 5 TABLET, FILM COATED ORAL at 21:20

## 2017-09-16 RX ADMIN — CARVEDILOL 6.25 MG: 6.25 TABLET, FILM COATED ORAL at 18:15

## 2017-09-16 RX ADMIN — Medication 5 ML: at 05:12

## 2017-09-16 RX ADMIN — ISOSORBIDE MONONITRATE 30 MG: 30 TABLET, EXTENDED RELEASE ORAL at 08:57

## 2017-09-16 NOTE — PROGRESS NOTES
Problem: Patient Education: Go to Patient Education Activity  Goal: Patient/Family Education  Outcome: Progressing Towards Goal  Patients sacrum blanchable, moves and turns self in bed. No breakdown visualized.

## 2017-09-16 NOTE — PROGRESS NOTES
Verbal bedside report given to Shaun Mcmahon oncoming RN. Patient's situation, background, assessment and recommendations provided. Opportunity for questions provided. Oncoming RN assumed care of patient.

## 2017-09-16 NOTE — PROGRESS NOTES
Problem: Falls - Risk of  Goal: *Absence of Falls  Document Jac Fall Risk and appropriate interventions in the flowsheet.    Outcome: Progressing Towards Goal  Fall Risk Interventions:  Mobility Interventions: Bed/chair exit alarm     Mentation Interventions: Bed/chair exit alarm     Medication Interventions: Bed/chair exit alarm     Elimination Interventions: Bed/chair exit alarm     History of Falls Interventions: Bed/chair exit alarm

## 2017-09-16 NOTE — PROGRESS NOTES
Verbal bedside report received from Candance Miner, RN. Assumed care of patient. R groin site visualized.

## 2017-09-16 NOTE — PROGRESS NOTES
Bedside and Verbal shift change report given to self (oncoming nurse) by Victor Hugosukumar Taylor (offgoing nurse). Report included the following information SBAR, Kardex, MAR and Recent Results.

## 2017-09-16 NOTE — PROGRESS NOTES
Verbal bedside report given to oncoming RN, Sony Bachelor. Patient's situation, background, assessment and recommendations provided. Opportunity for questions provided. Oncoming RN assumed care of patient.

## 2017-09-16 NOTE — PROGRESS NOTES
[facility] CARDIOLOGY PROGRESS NOTE           9/16/2017 7:51 AM    Admit Date: 9/11/2017    Admit Diagnosis: Syncope and collapse;Syncope and collapse;Syncope      Subjective:   No complaints this AM, no chest pain or shortness of breath    Interval History: (History of pertinent interval events obtained from nursing staff)  No events    ROS:  GEN:  No fever or chills  Cardiovascular:  As noted above  Pulmonary:  As noted above  Neuro:  No new focal motor or sensory loss      Objective:     Vitals:    09/15/17 1705 09/15/17 2106 09/16/17 0117 09/16/17 0531   BP: 105/43 134/72 110/60 134/65   Pulse: 67 71 68 75   Resp: 16 17 16 16   Temp: 98.3 °F (36.8 °C) 98.3 °F (36.8 °C) 97 °F (36.1 °C) 97.6 °F (36.4 °C)   SpO2: 97% 97% 98% 99%   Weight:    61.1 kg (134 lb 11.2 oz)   Height:           Physical Exam:  General- chronically ill appearing, frail  Neck- supple, no JVD  CV- regular rate and rhythm no MRG  Lung- clear bilaterally  Abd- soft, nontender, nondistended  Ext- no edema bilaterally  Skin- warm and dry    Current Facility-Administered Medications   Medication Dose Route Frequency    apixaban (ELIQUIS) tablet 5 mg  5 mg Oral BID    carvedilol (COREG) tablet 6.25 mg  6.25 mg Oral BID WITH MEALS    lisinopril (PRINIVIL, ZESTRIL) tablet 5 mg  5 mg Oral DAILY    isosorbide mononitrate ER (IMDUR) tablet 30 mg  30 mg Oral DAILY    povidone-iodine (BETADINE) 10 % topical solution   Topical DAILY    aspirin chewable tablet 81 mg  81 mg Oral DAILY    insulin lispro (HUMALOG) injection   SubCUTAneous AC&HS    atorvastatin (LIPITOR) tablet 40 mg  40 mg Oral QHS    sodium chloride (NS) flush 5-10 mL  5-10 mL IntraVENous Q8H    sodium chloride (NS) flush 5-10 mL  5-10 mL IntraVENous PRN    acetaminophen (TYLENOL) tablet 650 mg  650 mg Oral Q4H PRN    promethazine (PHENERGAN) with saline injection 12.5 mg  12.5 mg IntraVENous Q6H PRN    docusate sodium (COLACE) capsule 100 mg  100 mg Oral DAILY Data Review:   Recent Results (from the past 24 hour(s))   GLUCOSE, POC    Collection Time: 09/15/17 11:55 AM   Result Value Ref Range    Glucose (POC) 142 (H) 65 - 100 mg/dL   GLUCOSE, POC    Collection Time: 09/15/17  4:10 PM   Result Value Ref Range    Glucose (POC) 344 (H) 65 - 100 mg/dL   GLUCOSE, POC    Collection Time: 09/15/17  9:05 PM   Result Value Ref Range    Glucose (POC) 119 (H) 65 - 100 mg/dL   GLUCOSE, POC    Collection Time: 09/16/17  6:11 AM   Result Value Ref Range    Glucose (POC) 136 (H) 65 - 100 mg/dL   CBC WITH AUTOMATED DIFF    Collection Time: 09/16/17  6:55 AM   Result Value Ref Range    WBC 4.9 4.3 - 11.1 K/uL    RBC 4.09 (L) 4.23 - 5.67 M/uL    HGB 12.1 (L) 13.6 - 17.2 g/dL    HCT 34.5 (L) 41.1 - 50.3 %    MCV 84.4 79.6 - 97.8 FL    MCH 29.6 26.1 - 32.9 PG    MCHC 35.1 (H) 31.4 - 35.0 g/dL    RDW 13.1 11.9 - 14.6 %    PLATELET 841 576 - 768 K/uL    MPV 11.7 10.8 - 14.1 FL    DF AUTOMATED      NEUTROPHILS 57 43 - 78 %    LYMPHOCYTES 30 13 - 44 %    MONOCYTES 8 4.0 - 12.0 %    EOSINOPHILS 5 0.5 - 7.8 %    BASOPHILS 0 0.0 - 2.0 %    IMMATURE GRANULOCYTES 0.2 0.0 - 5.0 %    ABS. NEUTROPHILS 2.8 1.7 - 8.2 K/UL    ABS. LYMPHOCYTES 1.5 0.5 - 4.6 K/UL    ABS. MONOCYTES 0.4 0.1 - 1.3 K/UL    ABS. EOSINOPHILS 0.3 0.0 - 0.8 K/UL    ABS. BASOPHILS 0.0 0.0 - 0.2 K/UL    ABS. IMM.  GRANS. 0.0 0.0 - 0.5 K/UL       EKG:  (EKG has been independently visualized by me with interpretation below)  Assessment:     Principal Problem:    Syncope and collapse (9/11/2017)    Active Problems:    HTN (hypertension) (10/10/2015)      DM (diabetes mellitus) ([facility] (10/10/2015)      Precordial pain (9/11/2017)      Hx of stroke without residual deficits (9/11/2017)      YULY (acute kidney injury) ([facility] (9/11/2017)      Chronic systolic CHF (congestive heart failure) ([facility] (9/11/2017)      Coronary artery disease involving coronary bypass graft (9/11/2017)      Noncompliance (9/11/2017)      Syncope (9/13/2017)      Plan:   1. Syncope: Pt undergoing placement for Life Vest, will follow up post discharge for further discussion  2. Chronic CHF: cont OMT  3. CAD: cont OMT, stopped brilinta as no stent placed  4. Dispo: awaiting rehab placement, needs life vest prior to discharge    Erick Rao MD  Cardiology/Electrophysiology

## 2017-09-16 NOTE — PROGRESS NOTES
Problem: Mobility Impaired (Adult and Pediatric)  Goal: *Acute Goals and Plan of Care (Insert Text)  STG:  (1.)Mr. Dyana Heimlich will move from supine to sit and sit to supine , scoot up and down and roll side to side with SUPERVISION within 3 day(s). (2.)Mr. Dyana Heimlich will transfer from bed to chair and chair to bed with CONTACT GUARD ASSIST using the least restrictive device within 3 day(s). (3.)Mr. Dyana Heimlich will ambulate with CONTACT GUARD ASSIST for 50 feet with the least restrictive device within 3 day(s). (4.)Mr. Dyana Heimlich will participate in therapeutic activity/exerices x 12 minutes for increased strength within 3 days. LTG:  (1.)Mr. Dyana Heimlich will move from supine to sit and sit to supine , scoot up and down and roll side to side in bed with MODIFIED INDEPENDENCE within 7 day(s). (2.)Mr. Dyana Heimlich will transfer from bed to chair and chair to bed with SUPERVISION using the least restrictive device within 7 day(s). (3.)Mr. Dyana Heimlich will ambulate with SUPERVISION for 75 feet with the least restrictive device within 7 day(s). (4.)Mr. Dyana Heimlich will participate in therapeutic activity/exerices x 23 minutes for increased strength within 7 days. ________________________________________________________________________________________________      PHYSICAL THERAPY: Daily Note and PM 9/16/2017  INPATIENT: Hospital Day: 6  Payor: Diana Mccloud / Plan: [id number] PPO/PFFS / Product Type: [employer] /      NAME/AGE/GENDER: Libia Ruvalcaba is a 67 y.o. male        PRIMARY DIAGNOSIS: Syncope and collapse  Syncope and collapse  Syncope Syncope and collapse Syncope and collapse        ICD-10: Treatment Diagnosis:       · Generalized Muscle Weakness (M62.81)  · Difficulty in walking, Not elsewhere classified (R26.2)  · Other abnormalities of gait and mobility (R26.89)  · History of falling (Z91.81)   Precaution/Allergies:  Review of patient's allergies indicates no known allergies.        ASSESSMENT:      Mr. Dyana Heimlich is a pleasant but confused 67 y.o. male in the hospital for the above that occurred at a grocery store. Pt has a PMH of CVA with apparent residual dysphagia and is difficult to understand. Pt's daughter was present and assist with providing updated history for pt. She reports that her father lives with his wife in a split level home with 4 steps to enter. She also stated that he occasionally walks with a quad cane and has a history of falling. Per daughter pt also struggles with ADLs, especially getting out of tub. Mr. Val Scott reports that he using a riding  to cut neighbors grass and recently sustained injury to R toe after he ran over his foot with it. Mr. Val Scott presents to PT with generally decreased strength and AROM in B LEs. He reports no numbness or tingling but has decreased L knee extension PROM. Pt struggles with concentration and command following while being disoriented to month/year. He was able to come to sitting on edge of bed with CGA x 1 and mod cuing. He then stood with CGA x 1 and ambulated in chan with CGA x 1 with a two wheel rolling walker for 200 feet with deficits noted below . No dizziness is reported today. Mr. Val Scott appears to be functioning below his baseline and could benefit from skilled PT. This section established at most recent assessment   PROBLEM LIST (Impairments causing functional limitations):  1. Decreased Strength  2. Decreased ADL/Functional Activities  3. Decreased Transfer Abilities  4. Decreased Ambulation Ability/Technique  5. Decreased Balance  6. Decreased Activity Tolerance  7. Decreased Cognition    INTERVENTIONS PLANNED: (Benefits and precautions of physical therapy have been discussed with the patient.)  1. Balance Exercise  2. Bed Mobility  3. Family Education  4. Gait Training  5. Neuromuscular Re-education/Strengthening  6. Therapeutic Activites  7. Therapeutic Exercise/Strengthening  8. Transfer Training  9.  Group Therapy      TREATMENT PLAN: Frequency/Duration: 3 times a week for duration of hospital stay  Rehabilitation Potential For Stated Goals: GOOD      RECOMMENDED REHABILITATION/EQUIPMENT: (at time of discharge pending progress): Due to the probability of continued deficits (see above) this patient will likely need continued skilled physical therapy after discharge. Equipment:   · None at this time                   HISTORY:   History of Present Injury/Illness (Reason for Referral):  Per H&P:    HPI:   Mr. Mau Temple is a 66 yo M with PMHx of CAD s/p CABG x2 (1st one when he was 36 yo), chronic systolic CHF with EF 40% in 10/10/2015 followed by Dr. Sinai Saez MD, IDDM on novolog Mix 70/30 12 UI BID, HTN, Hx of recurrent CVA on chronic eliquis, hx of noncompliance with medication presented to [facility] brought by EMS after he had a syncopal event while he was doing his groceries. Unknown how log LOC was. Unknown if head trauma. States that prior to that he had severe chest pain in midsternal area 7/10 , w/o radiation for  approx 6-0 minutes. Received  mg  And 1 L NS per EMS. Mr. Mau Temple states that he didn't took his insulin for florence=porx 10 days. He states that he didn't took his other medications either, although family at bedside states that he is taking only his eliquis. Complains of polyuria and polydipsia in the last week. In ED WBC:6.2, BS:541, Cr:1.38, Trop I:0.19, EKG with old LBBB, sinus tach with fusion complexes. 10+ point ROS done and is negative except as noted in HPI. Past Medical History/Comorbidities:   Mr. Mau Temple  has a past medical history of CVA (cerebral infarction) (2010); Diabetes ([facility] [address] and HTN (hypertension).   Mr. Mau Temple  has a past surgical history that includes cardiac surg procedure unlist.  Social History/Living Environment:   Home Environment: Private residence  # Steps to Enter: 4  Rails to Enter: Yes  Hand Rails : Right  One/Two Story Residence: Split level  # of Interior Steps: 1  Living Alone: No  Support Systems: Child(trevon), Spouse/Significant Other/Partner  Patient Expects to be Discharged to[de-identified] Unknown  Current DME Used/Available at Home: Cane, quad, Walker, rolling  Tub or Shower Type: Tub/Shower combination  Prior Level of Function/Work/Activity:  Lives with wife in split level home with steps to enter. Reports that he is independent with ADLs (which daughter contradicted) and uses a quad cane for ambulation (daughter reported he forget to). Also has had several recent falls. Number of Personal Factors/Comorbidities that affect the Plan of Care:  CVA  DM  Falls 3+: HIGH COMPLEXITY   EXAMINATION:   Most Recent Physical Functioning:   Gross Assessment:                  Posture:     Balance:  Sitting: Impaired  Sitting - Static: Fair (occasional)  Sitting - Dynamic: Fair (occasional)  Standing: Impaired  Standing - Static: Fair  Standing - Dynamic : Fair Bed Mobility:  Rolling: Contact guard assistance  Supine to Sit: Contact guard assistance  Sit to Supine: Contact guard assistance  Scooting: Contact guard assistance  Wheelchair Mobility:     Transfers:  Sit to Stand: Contact guard assistance  Stand to Sit: Contact guard assistance  Bed to Chair: Contact guard assistance  Gait:     Base of Support: Center of gravity altered;Narrowed  Speed/Mirta: Fluctuations; Pace decreased (<100 feet/min); Shuffled; Slow  Step Length: Left shortened;Right shortened  Swing Pattern: Left asymmetrical;Right asymmetrical  Stance: Time;Weight shift  Gait Abnormalities: Decreased step clearance;Trunk sway increased  Distance (ft): 200 Feet (ft)  Assistive Device: Walker, rolling  Ambulation - Level of Assistance: Contact guard assistance  Interventions: Manual cues; Safety awareness training; Tactile cues; Verbal cues; Visual/Demos       Body Structures Involved:  1. Nerves  2. Heart  3. Muscles Body Functions Affected:  1. Mental  2. Cardio  3. Neuromusculoskeletal  4.  Movement Related Activities and Participation Affected:  1. Learning and Applying Knowledge  2. General Tasks and Demands  3. Communication  4. Mobility  5. Self Care  6. Domestic Life  7. Community, Social and [place]   Number of elements that affect the Plan of Care: 4+: HIGH COMPLEXITY   CLINICAL PRESENTATION:   Presentation: Evolving clinical presentation with changing clinical characteristics: MODERATE COMPLEXITY   CLINICAL DECISION MAKIN [facility] Mobility Inpatient Short Form  How much difficulty does the patient currently have. .. Unable A Lot A Little None   1. Turning over in bed (including adjusting bedclothes, sheets and blankets)? [ ] 1   [ ] 2   [ ] 3   [X] 4   2. Sitting down on and standing up from a chair with arms ( e.g., wheelchair, bedside commode, etc.)   [ ] 1   [ ] 2   [X] 3   [ ] 4   3. Moving from lying on back to sitting on the side of the bed? [ ] 1   [ ] 2   [X] 3   [ ] 4   How much help from another person does the patient currently need. .. Total A Lot A Little None   4. Moving to and from a bed to a chair (including a wheelchair)? [ ] 1   [ ] 2   [X] 3   [ ] 4   5. Need to walk in hospital room? [ ] 1   [ ] 2   [X] 3   [ ] 4   6. Climbing 3-5 steps with a railing? [ ] 1   [ ] 2   [X] 3   [ ] 4   © , Trustees of [address] [place] [address], under license to [employer]. All rights reserved    Score:  Initial: 19 Most Recent: X (Date: -- )     Interpretation of Tool:  Represents activities that are increasingly more difficult (i.e. Bed mobility, Transfers, Gait).        Score 24 23 22-20 19-15 14-10 9-7 6       Modifier CH CI CJ CK CL CM CN         · Mobility - Walking and Moving Around:               - CURRENT STATUS:    CK - 40%-59% impaired, limited or restricted               - GOAL STATUS:           CJ - 20%-39% impaired, limited or restricted               - D/C STATUS:                       ---------------To be determined---------------  Payor: Lupe Sifuentes / Plan: [employer] MEDICARE CHOICE PPO/PFFS / Product Type: Managed Care Medicare /       Medical Necessity:     · Patient demonstrates good rehab potential due to higher previous functional level. Reason for Services/Other Comments:  · Patient continues to require skilled intervention due to decreased balance, ambulation, and functional mobility. Use of outcome tool(s) and clinical judgement create a POC that gives a: Questionable prediction of patient's progress: MODERATE COMPLEXITY                 TREATMENT:   (In addition to Assessment/Re-Assessment sessions the following treatments were rendered)   Pre-treatment Symptoms/Complaints:  Improved cognition today. Pain: Initial:   Pain Intensity 1: 0  Post Session:  0      Therapeutic Activity: (    23 mins): Therapeutic activities including Bed transfers, Chair transfers, Ambulation on level ground and dynamic transfers, ambulation and mobility to improve strength, balance, coordination and dynamic movement of leg - bilateral to improve functional dynamic mobility, transfers, and ambulation. Required minimal Manual cues; Safety awareness training; Tactile cues; Verbal cues; Visual/Demos to promote motor control of bilateral, lower extremity(s). Date:   Date:   Date:     Activity/Exercise Parameters Parameters Parameters                                                 Braces/Orthotics/Lines/Etc:   · O2 Device: Room air  Treatment/Session Assessment:    · Response to Treatment:  Pt tolerated fairly given decreased cognitive status and dizziness with walking. · Interdisciplinary Collaboration:  · Physical Therapist and Registered Nurse  · After treatment position/precautions:  · Bed/Chair-wheels locked  · Bed in low position  · Family at bedside  · Sitting on edge of bed with OT present  · Compliance with Program/Exercises: Will assess as treatment progresses. · Recommendations/Intent for next treatment session:   \"Next visit will focus on advancements to more challenging activities and reduction in assistance provided\".   Total Treatment Duration:PT Patient Time In/Time Out  Time In: 1300  Time Out: [address], [address]

## 2017-09-17 LAB
GLUCOSE BLD STRIP.AUTO-MCNC: 127 MG/DL (ref 65–100)
GLUCOSE BLD STRIP.AUTO-MCNC: 252 MG/DL (ref 65–100)
GLUCOSE BLD STRIP.AUTO-MCNC: 252 MG/DL (ref 65–100)
GLUCOSE BLD STRIP.AUTO-MCNC: 285 MG/DL (ref 65–100)

## 2017-09-17 PROCEDURE — 74011250637 HC RX REV CODE- 250/637: Performed by: NURSE PRACTITIONER

## 2017-09-17 PROCEDURE — 82962 GLUCOSE BLOOD TEST: CPT

## 2017-09-17 PROCEDURE — 74011636637 HC RX REV CODE- 636/637: Performed by: HOSPITALIST

## 2017-09-17 PROCEDURE — 65660000000 HC RM CCU STEPDOWN

## 2017-09-17 PROCEDURE — 74011250637 HC RX REV CODE- 250/637: Performed by: INTERNAL MEDICINE

## 2017-09-17 PROCEDURE — 74011250637 HC RX REV CODE- 250/637: Performed by: HOSPITALIST

## 2017-09-17 RX ADMIN — INSULIN LISPRO 6 UNITS: 100 INJECTION, SOLUTION INTRAVENOUS; SUBCUTANEOUS at 12:06

## 2017-09-17 RX ADMIN — CARVEDILOL 6.25 MG: 6.25 TABLET, FILM COATED ORAL at 17:46

## 2017-09-17 RX ADMIN — INSULIN LISPRO 6 UNITS: 100 INJECTION, SOLUTION INTRAVENOUS; SUBCUTANEOUS at 17:46

## 2017-09-17 RX ADMIN — APIXABAN 5 MG: 5 TABLET, FILM COATED ORAL at 21:05

## 2017-09-17 RX ADMIN — Medication 5 ML: at 21:06

## 2017-09-17 RX ADMIN — DOCUSATE SODIUM 100 MG: 100 CAPSULE, LIQUID FILLED ORAL at 08:57

## 2017-09-17 RX ADMIN — ATORVASTATIN CALCIUM 40 MG: 40 TABLET, FILM COATED ORAL at 21:05

## 2017-09-17 RX ADMIN — Medication 5 ML: at 05:16

## 2017-09-17 RX ADMIN — INSULIN LISPRO 6 UNITS: 100 INJECTION, SOLUTION INTRAVENOUS; SUBCUTANEOUS at 22:25

## 2017-09-17 RX ADMIN — ASPIRIN 81 MG 81 MG: 81 TABLET ORAL at 08:57

## 2017-09-17 RX ADMIN — APIXABAN 5 MG: 5 TABLET, FILM COATED ORAL at 08:57

## 2017-09-17 RX ADMIN — POVIDONE-IODINE: 10 SOLUTION TOPICAL at 09:01

## 2017-09-17 RX ADMIN — ISOSORBIDE MONONITRATE 30 MG: 30 TABLET, EXTENDED RELEASE ORAL at 08:58

## 2017-09-17 RX ADMIN — CARVEDILOL 6.25 MG: 6.25 TABLET, FILM COATED ORAL at 08:58

## 2017-09-17 RX ADMIN — LISINOPRIL 5 MG: 5 TABLET ORAL at 08:58

## 2017-09-17 NOTE — PROGRESS NOTES
Verbal and bedside report received from [place], [facility].

## 2017-09-17 NOTE — PROGRESS NOTES
[facility] CARDIOLOGY PROGRESS NOTE           9/17/2017 7:50 AM    Admit Date: 9/11/2017    Admit Diagnosis: Syncope and collapse;Syncope and collapse;Syncope      Subjective:   No complaints this AM, no chest pain or shortness of breath    Interval History: (History of pertinent interval events obtained from nursing staff)  No events    ROS:  GEN:  No fever or chills  Cardiovascular:  As noted above  Pulmonary:  As noted above  Neuro:  No new focal motor or sensory loss      Objective:     Vitals:    09/16/17 1704 09/16/17 2114 09/17/17 0051 09/17/17 0448   BP: 145/85 117/64 103/64 143/77   Pulse: 71 76 73 83   Resp: 17 18 18 16   Temp: 97.3 °F (36.3 °C) 97.9 °F (36.6 °C) 98 °F (36.7 °C) 97.6 °F (36.4 °C)   SpO2: 97% 98% 99% 99%   Weight:    57.3 kg (126 lb 6.4 oz)   Height:           Physical Exam:  General- chronically ill appearing, frail  Neck- supple, no JVD  CV- regular rate and rhythm no MRG  Lung- clear bilaterally  Abd- soft, nontender, nondistended  Ext- no edema bilaterally  Skin- warm and dry    Current Facility-Administered Medications   Medication Dose Route Frequency    apixaban (ELIQUIS) tablet 5 mg  5 mg Oral BID    carvedilol (COREG) tablet 6.25 mg  6.25 mg Oral BID WITH MEALS    lisinopril (PRINIVIL, ZESTRIL) tablet 5 mg  5 mg Oral DAILY    isosorbide mononitrate ER (IMDUR) tablet 30 mg  30 mg Oral DAILY    povidone-iodine (BETADINE) 10 % topical solution   Topical DAILY    aspirin chewable tablet 81 mg  81 mg Oral DAILY    insulin lispro (HUMALOG) injection   SubCUTAneous AC&HS    atorvastatin (LIPITOR) tablet 40 mg  40 mg Oral QHS    sodium chloride (NS) flush 5-10 mL  5-10 mL IntraVENous Q8H    sodium chloride (NS) flush 5-10 mL  5-10 mL IntraVENous PRN    acetaminophen (TYLENOL) tablet 650 mg  650 mg Oral Q4H PRN    promethazine (PHENERGAN) with saline injection 12.5 mg  12.5 mg IntraVENous Q6H PRN    docusate sodium (COLACE) capsule 100 mg  100 mg Oral DAILY Data Review:   Recent Results (from the past 24 hour(s))   GLUCOSE, POC    Collection Time: 09/16/17 11:33 AM   Result Value Ref Range    Glucose (POC) 231 (H) 65 - 100 mg/dL   GLUCOSE, POC    Collection Time: 09/16/17  5:08 PM   Result Value Ref Range    Glucose (POC) 236 (H) 65 - 100 mg/dL   GLUCOSE, POC    Collection Time: 09/16/17  9:38 PM   Result Value Ref Range    Glucose (POC) 291 (H) 65 - 100 mg/dL   GLUCOSE, POC    Collection Time: 09/17/17  6:14 AM   Result Value Ref Range    Glucose (POC) 127 (H) 65 - 100 mg/dL       EKG:  (EKG has been independently visualized by me with interpretation below)  Assessment:     Principal Problem:    Syncope and collapse (9/11/2017)    Active Problems:    HTN (hypertension) (10/10/2015)      DM (diabetes mellitus) ([facility] [place] [address] (10/10/2015)      Precordial pain (9/11/2017)      Hx of stroke without residual deficits (9/11/2017)      YULY (acute kidney injury) ([facility] [address] (9/11/2017)      Chronic systolic CHF (congestive heart failure) ([facility] [place] [address] (9/11/2017)      Coronary artery disease involving coronary bypass graft (9/11/2017)      Noncompliance (9/11/2017)      Syncope (9/13/2017)      Plan:   1. Syncope: Pt undergoing placement for Life Vest, will follow up post discharge for further discussion  2. Chronic CHF: cont OMT, stable, euvolemic  3. CAD: cont OMT, stopped brilinta as no stent placed  4. Dispo: awaiting rehab placement, needs life vest prior to discharge    Erick Rao MD  Cardiology/Electrophysiology

## 2017-09-17 NOTE — PROGRESS NOTES
Verbal bedside report given to oncoming RNMita. Patient's situation, background, assessment and recommendations provided. Opportunity for questions provided. Oncoming RN assumed care of patient.

## 2017-09-17 NOTE — PROGRESS NOTES
Bedside and Verbal shift change report given to self (oncoming nurse) by Mallika Sullivan (offgoing nurse). Report included the following information SBAR, Kardex, MAR and Recent Results.

## 2017-09-18 VITALS
BODY MASS INDEX: 18.84 KG/M2 | RESPIRATION RATE: 16 BRPM | SYSTOLIC BLOOD PRESSURE: 100 MMHG | WEIGHT: 134.6 LBS | OXYGEN SATURATION: 94 % | HEART RATE: 68 BPM | HEIGHT: 71 IN | DIASTOLIC BLOOD PRESSURE: 61 MMHG | TEMPERATURE: 98.1 F

## 2017-09-18 LAB
GLUCOSE BLD STRIP.AUTO-MCNC: 155 MG/DL (ref 65–100)
GLUCOSE BLD STRIP.AUTO-MCNC: 235 MG/DL (ref 65–100)
GLUCOSE BLD STRIP.AUTO-MCNC: 289 MG/DL (ref 65–100)

## 2017-09-18 PROCEDURE — 74011250637 HC RX REV CODE- 250/637: Performed by: INTERNAL MEDICINE

## 2017-09-18 PROCEDURE — 82962 GLUCOSE BLOOD TEST: CPT

## 2017-09-18 PROCEDURE — 97530 THERAPEUTIC ACTIVITIES: CPT

## 2017-09-18 PROCEDURE — 74011636637 HC RX REV CODE- 636/637: Performed by: HOSPITALIST

## 2017-09-18 PROCEDURE — 74011250637 HC RX REV CODE- 250/637: Performed by: NURSE PRACTITIONER

## 2017-09-18 PROCEDURE — 74011250637 HC RX REV CODE- 250/637: Performed by: HOSPITALIST

## 2017-09-18 PROCEDURE — 97110 THERAPEUTIC EXERCISES: CPT

## 2017-09-18 RX ORDER — ISOSORBIDE MONONITRATE 30 MG/1
30 TABLET, EXTENDED RELEASE ORAL DAILY
Qty: 30 TAB | Refills: 11 | Status: SHIPPED
Start: 2017-09-18

## 2017-09-18 RX ORDER — LISINOPRIL 5 MG/1
5 TABLET ORAL DAILY
Qty: 30 TAB | Refills: 11 | Status: SHIPPED
Start: 2017-09-18

## 2017-09-18 RX ORDER — GUAIFENESIN 100 MG/5ML
81 LIQUID (ML) ORAL DAILY
Status: SHIPPED | COMMUNITY
Start: 2017-09-18

## 2017-09-18 RX ORDER — CARVEDILOL 6.25 MG/1
6.25 TABLET ORAL 2 TIMES DAILY WITH MEALS
Qty: 60 TAB | Refills: 11 | Status: SHIPPED
Start: 2017-09-18

## 2017-09-18 RX ADMIN — ASPIRIN 81 MG 81 MG: 81 TABLET ORAL at 08:50

## 2017-09-18 RX ADMIN — Medication 5 ML: at 15:21

## 2017-09-18 RX ADMIN — APIXABAN 5 MG: 5 TABLET, FILM COATED ORAL at 08:50

## 2017-09-18 RX ADMIN — INSULIN LISPRO 6 UNITS: 100 INJECTION, SOLUTION INTRAVENOUS; SUBCUTANEOUS at 12:13

## 2017-09-18 RX ADMIN — POVIDONE-IODINE: 10 SOLUTION TOPICAL at 08:56

## 2017-09-18 RX ADMIN — CARVEDILOL 6.25 MG: 6.25 TABLET, FILM COATED ORAL at 16:29

## 2017-09-18 RX ADMIN — INSULIN LISPRO 2 UNITS: 100 INJECTION, SOLUTION INTRAVENOUS; SUBCUTANEOUS at 08:51

## 2017-09-18 RX ADMIN — DOCUSATE SODIUM 100 MG: 100 CAPSULE, LIQUID FILLED ORAL at 08:50

## 2017-09-18 RX ADMIN — ISOSORBIDE MONONITRATE 30 MG: 30 TABLET, EXTENDED RELEASE ORAL at 08:51

## 2017-09-18 RX ADMIN — CARVEDILOL 6.25 MG: 6.25 TABLET, FILM COATED ORAL at 08:51

## 2017-09-18 RX ADMIN — INSULIN LISPRO 4 UNITS: 100 INJECTION, SOLUTION INTRAVENOUS; SUBCUTANEOUS at 16:30

## 2017-09-18 RX ADMIN — Medication 5 ML: at 05:40

## 2017-09-18 RX ADMIN — LISINOPRIL 5 MG: 5 TABLET ORAL at 08:51

## 2017-09-18 NOTE — PROGRESS NOTES
Problem: Self Care Deficits Care Plan (Adult)  Goal: *Acute Goals and Plan of Care (Insert Text)  1. Patient will feed self entire meal with modified independence and adaptive utensils as needed. 2. Patient will groom self with modified independence and adaptive equipment as needed. 3. Patient will complete full body dressing and bathing with SBA and adaptive equipment as needed. 4. Patient will participate in BUE therapeutic exercises to increase strength in LUE to at least 5/5 for participation in ADLs and functional transfers. 5. Patient will participate in [address] [place] [address] therapeutic activities to increase coordination in E to [facility] for bimanual fine motor ADLs. 6. Patient will complete toileting with supervision and least restrictive adaptive equipment. 7. Patient will complete functional transfers with supervision and adaptive equipment as needed. Timeframe: 7 visits     Comments:       OCCUPATIONAL THERAPY: Daily Note, Treatment Day: 1st and PM 9/18/2017  INPATIENT: Hospital Day: 8  Payor: Ronda Pop / Plan: [id number] PPO/PFFS / Product Type: [employer] Care Medicare /      NAME/AGE/GENDER: Serena Sorenson is a 67 y.o. male        PRIMARY DIAGNOSIS:  Syncope and collapse  Syncope and collapse  Syncope Syncope and collapse Syncope and collapse        ICD-10: Treatment Diagnosis:        · Generalized Muscle Weakness (M62.81)  · Other lack of cordination (R27.8)  · Repeated Falls (R29.6)  · History of falling (Z91.81)  · Hemiplegia and hemiparesis following cerebral infarction affecting left non-dominant side (S88.901)   Precautions/Allergies:         Review of patient's allergies indicates no known allergies. ASSESSMENT:   Mr. Fany Garcia was admitted for the above diagnosis. Pt presents supine upon arrival. Pt did not need any assistance for supine to sit transfer. Pt demonstrates fair sitting balance at edge of bed.  Pt practiced functional mobility in hallway using rolling walker to increase strength, balance, and activity tolerance. Pt did well with mobility. Returned to room and to recliner. Pt able to demonstrate the ability to don/doff socks. Suggested that patient look into getting tub bench to assist with bathing at home for safety concerns. Pt left sitting up in chair with family in room. Will continue to benefit from skilled OT during stay. This section established at most recent assessment   PROBLEM LIST (Impairments causing functional limitations):  1. Decreased Strength  2. Decreased ADL/Functional Activities  3. Decreased Transfer Abilities  4. Decreased Ambulation Ability/Technique  5. Decreased Balance  6. Decreased Activity Tolerance  7. Increased Shortness of Breath  8. Decreased Flexibility/Joint Mobility  9. Decreased [place] with Home Exercise Program  10. Decreased Cognition    INTERVENTIONS PLANNED: (Benefits and precautions of occupational therapy have been discussed with the patient.)  1. Activities of daily living training  2. Adaptive equipment training  3. Balance training  4. Clothing management  5. Cognitive training  6. Donning&doffing training  7. Group therapy  8. Neuromuscular re-eduation  9. Therapeutic activity  10. Therapeutic exercise      TREATMENT PLAN: Frequency/Duration: Follow patient 3x/week to address above goals. Rehabilitation Potential For Stated Goals: GOOD      RECOMMENDED REHABILITATION/EQUIPMENT: (at time of discharge pending progress): Due to the probability of continued deficits (see above) this patient will likely need continued skilled occupational therapy after discharge. Equipment:   · None at this time                 OCCUPATIONAL PROFILE AND HISTORY:   History of Present Injury/Illness (Reason for Referral):  See H&P  Past Medical History/Comorbidities:   Mr. Tomas Nielsen  has a past medical history of CVA (cerebral infarction) (2010); Diabetes ([facility] [address] and HTN (hypertension).   Mr. Tomas Nielsen  has a past surgical history that includes cardiac surg procedure unlist.  Social History/Living Environment:   Home Environment: Private residence  # Steps to Enter: 4  Rails to Enter: Yes  Hand Rails : Right  One/Two Story Residence: [facility] level  # of Interior Steps: 1  Living Alone: No  Support Systems: Child(trevon), Spouse/Significant Other/Partner  Patient Expects to be Discharged to[de-identified] Unknown  Current DME Used/Available at Home: Cane, quad, Walker, rolling  Tub or Shower Type: Tub/Shower combination  Prior Level of Function/Work/Activity:  Requires assistance at baseline with ADLs per daughter   Personal Factors:          Sex:  male        Age:  67 y.o. Number of Personal Factors/Comorbidities that affect the Plan of Care: Expanded review of therapy/medical records (1-2):  MODERATE COMPLEXITY   ASSESSMENT OF OCCUPATIONAL PERFORMANCE[de-identified]   Activities of Daily Living:           Basic ADLs (From Assessment) Complex ADLs (From Assessment)   Basic ADL  Feeding: Setup  Oral Facial Hygiene/Grooming: Minimum assistance  Bathing: Moderate assistance  Upper Body Dressing: Setup  Lower Body Dressing:  Moderate assistance  Toileting: Minimum assistance Instrumental ADL  Meal Preparation: Moderate assistance  Homemaking: Maximum assistance  Medication Management: Supervision  Financial Management: Supervision   Grooming/Bathing/Dressing Activities of Daily Living     Cognitive Retraining  Safety/Judgement: Awareness of environment                       Bed/Mat Mobility  Supine to Sit: Modified independent  Sit to Stand: Supervision  Bed to Chair: Stand-by asssistance  Scooting: Modified independent          Most Recent Physical Functioning:   Gross Assessment:                  Posture:  Posture (WDL): Exceptions to WDL  Posture Assessment: Rounded shoulders  Balance:  Sitting: Impaired  Sitting - Static: Good (unsupported)  Sitting - Dynamic: Prop sitting  Standing: Impaired  Standing - Static: Fair  Standing - Dynamic : Fair Bed Mobility:  Supine to Sit: Modified independent  Scooting: Modified independent  Wheelchair Mobility:     Transfers:  Sit to Stand: Supervision  Stand to Sit: Supervision  Bed to Chair: Stand-by asssistance                    Patient Vitals for the past 6 hrs:   BP BP Patient Position SpO2 Pulse   17 1351 100/61 At rest 94 % 68        Mental Status  Neurologic State: Alert  Orientation Level: Oriented to person, Oriented to place  Cognition: Follows commands  Perception: Appears intact  Perseveration: No perseveration noted  Safety/Judgement: Awareness of environment                               Physical Skills Involved:  1. Range of Motion  2. Balance  3. Strength  4. Activity Tolerance  5. Fine Motor Control  6. Gross Motor Control Cognitive Skills Affected (resulting in the inability to perform in a timely and safe manner):  1. Executive Function  2. Sustained Attention  3. Divided Attention  4. Expression Psychosocial Skills Affected:  1. Habits/Routines   Number of elements that affect the Plan of Care: 5+:  HIGH COMPLEXITY   CLINICAL DECISION MAKIN[address] [place] [id number] AM-PAC 6 Clicks   Daily Activity Inpatient Short Form  How much help from another person does the patient currently need. .. Total A Lot A Little None   1. Putting on and taking off regular lower body clothing?   [ ] 1   [X] 2   [ ] 3   [ ] 4   2. Bathing (including washing, rinsing, drying)? [ ] 1   [X] 2   [ ] 3   [ ] 4   3. Toileting, which includes using toilet, bedpan or urinal?   [ ] 1   [ ] 2   [X] 3   [ ] 4   4. Putting on and taking off regular upper body clothing?   [ ] 1   [ ] 2   [ ] 3   [X] 4   5. Taking care of personal grooming such as brushing teeth? [ ] 1   [ ] 2   [X] 3   [ ] 4   6. Eating meals? [ ] 1   [ ] 2   [ ] 3   [X] 4   © , Trustees of [address] [place] [address], under license to [employer].  All rights reserved    Score:  Initial: 18 Most Recent: X (Date: -- )     Interpretation of Tool:  Represents activities that are increasingly more difficult (i.e. Bed mobility, Transfers, Gait). Score 24 23 22-20 19-15 14-10 9-7 6       Modifier CH CI CJ CK CL CM CN         · Self Care:               - CURRENT STATUS:    CK - 40%-59% impaired, limited or restricted               - GOAL STATUS:           CJ - 20%-39% impaired, limited or restricted               - D/C STATUS:                       ---------------To be determined---------------  Payor: [employer] MEDICARE / Plan: [facility] [employer] MEDICARE CHOICE PPO/PFFS / Product Type: Managed Care Medicare /       Medical Necessity:     · Patient is expected to demonstrate progress in strength, balance, coordination and functional technique to decrease assistance required with ADLs. Reason for Services/Other Comments:  · Patient continues to require skilled intervention due to medical complications. Use of outcome tool(s) and clinical judgement create a POC that gives a: MODERATE COMPLEXITY             TREATMENT:   (In addition to Assessment/Re-Assessment sessions the following treatments were rendered)      Pre-treatment Symptoms/Complaints:    Pain: Initial:   Pain Intensity 1: 0  Post Session:  same      Therapeutic Activity: (20 minutes): Therapeutic activities including Bed transfers, Chair transfers, functional mobility on level ground to improve mobility, strength and activity tolerance. Required CGA to promote static and dynamic balance in standing. Braces/Orthotics/Lines/Etc:   · O2 Device: Room air  Treatment/Session Assessment:    · Response to Treatment:  eval only  · Interdisciplinary Collaboration:  · Certified Occupational Therapy Assistant and Registered Nurse  · After treatment position/precautions:  · Up in chair, Bed alarm/tab alert on, Bed/Chair-wheels locked, Call light within reach and Family at bedside  · Compliance with Program/Exercises: compliant all of the time. · Recommendations/Intent for next treatment session:   \"Next visit will focus on advancements to more challenging activities and reduction in assistance provided\".   Total Treatment Duration:OT Patient Time In/Time Out  Time In: 0864  Time Out: Nico Boss

## 2017-09-18 NOTE — PROGRESS NOTES
Verbal bedside report given to oncoming RN, Sue Ferraro. Patient's situation, background, assessment and recommendations provided. Opportunity for questions provided. Oncoming RN assumed care of patient.

## 2017-09-18 NOTE — PROGRESS NOTES
I spoke with patients wife, daughter Sabrina Roman [phone] regarding d/c planning. Patient has bed offer at [address] [place] [address] at [facility] but we are awaiting pre-cert approval from [facility]. Per Breanna Lombardi at [address] [place] [address] they have not received precertification yet for patient to come to their facility. Will continue to follow. Received call from [address] [place] [address] at [facility] that [facility] has approved patient to go to rehab and can come today if medically ready. Call placed to Dr. Nguyen Left to see if he is medically stable.

## 2017-09-18 NOTE — PROGRESS NOTES
Problem: Falls - Risk of  Goal: *Absence of Falls  Document Jac Fall Risk and appropriate interventions in the flowsheet.    Outcome: Progressing Towards Goal  Fall Risk Interventions:  Mobility Interventions: Bed/chair exit alarm     Mentation Interventions: Bed/chair exit alarm     Medication Interventions: Bed/chair exit alarm     Elimination Interventions: Call light in reach, Bed/chair exit alarm     History of Falls Interventions: Bed/chair exit alarm

## 2017-09-18 NOTE — PROGRESS NOTES
9/18/2017 9:13 AM    Admit Date: 9/11/2017    Admit Diagnosis: Syncope and collapse;Syncope and collapse;Syncope      Subjective:   NO cp or sob      Objective:      Visit Vitals    /77 (BP 1 Location: Right arm, BP Patient Position: At rest)    Pulse 77    Temp 97.8 °F (36.6 °C)    Resp 18    Ht 5' 11\" (1.803 m)    Wt 61.1 kg (134 lb 9.6 oz)    SpO2 100%    BMI 18.77 kg/m2       Physical Exam:  [facility], Well Nourished, No Acute Distress, Alert & Oriented x 3, appropriate mood. Neck- supple, no JVD  CV- regular rate and rhythm no MRG  Lung- clear bilaterally  Abd- soft, nontender, nondistended  Ext- no edema bilaterally. Skin- warm and dry        Data Review:   Recent Labs      09/16/17   0655   WBC  4.9   HGB  12.1*   HCT  34.5*   PLT  160       Assessment/Plan:     Principal Problem:    Syncope and collapse (9/11/2017)    Active Problems:    HTN (hypertension) (10/10/2015)Stable. Continue current medical therapy. DM (diabetes mellitus) ([facility] [address] (10/10/2015)      Precordial pain (9/11/2017)      Hx of stroke without residual deficits (9/11/2017)      YULY (acute kidney injury) ([address] (9/11/2017)      Chronic systolic CHF (congestive heart failure) ([facility] [address] (9/11/2017)Stable. Continue current medical therapy. Coronary artery disease involving coronary bypass graft (9/11/2017)Stable. Continue current medical therapy.       Noncompliance (9/11/2017)      Syncope (9/13/2017)- low ef- home with life vest and OP EPS follow up

## 2017-09-18 NOTE — DISCHARGE SUMMARY
[facility] Cardiology Discharge Summary     Patient ID:  Shala Ernst  [id number]  67 y.o.  1940    Admit date: 9/11/2017    Discharge date:  9/18/2017    Admitting Physician: Ana Resendiz DO     Discharge Physician: VIRGILIO Barton/Dr. Yaneli Ahmadi    Admission Diagnoses: Syncope and collapse  Syncope and collapse  Syncope    Discharge Diagnoses:    Diagnosis    Syncope    Syncope and collapse    Precordial pain    Hx of stroke without residual deficits    YULY (acute kidney injury) ([facility] [address]    Chronic systolic CHF (congestive heart failure) ([facility] [address]    Coronary artery disease involving coronary bypass graft    Noncompliance    CVA (cerebral infarction)    HTN (hypertension)    DM (diabetes mellitus) [facility])       Cardiology Procedures this admission:  Diagnostic left heart catheterization, echo   Consults: [facility] Course: Patient presented to the emergency department of [facility] with complaints of syncope and CP with h/o CAD s/p CABG. Patient was evaluated and subsequently admitted by the hospitalist for further cardiac evaluation and treatment. Cardiac enzymes were positive up to 1.37. Pt was transferred to our service. Hospitalist continued to manage diabetes. [facility] was planned for 9-14-17. Patient underwent cardiac catheterization by Dr. Veronica Hernadez which showed decreased EF, occluded LAD and RCA, unsuccessful attempted PCI to the LAD via SVG, patent SVG to LAD w mod p disease, guillory to circ paetnt, vg to RCA occluded. Patient tolerated the procedure well and returned to the telemetry floor for recovery. An echocardiogram was performed with report as follows:     -  Left ventricle: Systolic function was moderately reduced. Ejection   fraction  was estimated in the range of 30 % to 35 %. There was moderate diffuse  hypokinesis with regional variations. Wall thickness was mildly increased. -  Mitral valve: There was mild to moderate regurgitation. -  Tricuspid valve:  There was mild regurgitation. Pt was fitted for a life vest. It was felt he would need consideration for an ICD. He was debilitated and seen by PT/OT and social work who arranged rehab placement. Eliquis was restarted for h/o CVA. The afternoon of 9/18/2017 patient was up feeling well without any complaints of chest pain or shortness of breath and accepted placement to rehab. Patient's right femoral cath site was clean, dry and intact without hematoma or bruit. Patient's labs were WNL. Patient was seen and examined by Dr. Radhames Mondragon and determined stable and ready for discharge. For maximized medical therapy of CAD and CHF patient will continue use of BB, ACE-I, and statin as well. The patient will follow up with [facility] Cardiology Dr. Bessy Rios Sept 26 at 11:00[facility] office and has been referred to cardiac rehab. Will check BMP in one week. DISPOSITION: The patient is being discharged home in stable condition on a low saturated fat, low cholesterol and low salt diet. The patient is instructed to advance activities as tolerated to the limit of fatigue or shortness of breath. The patient is instructed to avoid all heavy lifting, straining, stooping or squatting for 3-5 days. The patient is instructed to watch the cath site for bleeding/oozing; if seen, the patient is instructed to apply firm pressure with a clean cloth and call [facility]iology at [phone]. The patient is instructed to watch for signs of infection which include: increasing area of redness, fever/hot to touch or purulent drainage at the catheterization site. The patient is instructed not to soak in a bathtub for 7-10 days, but is cleared to shower. The patient is instructed to call the office or return to the ER for immediate evaluation for any shortness of breath or chest pain not relieved by NTG.       Discharge Exam:   Visit Vitals    /61 (BP 1 Location: Right arm, BP Patient Position: At rest)    Pulse 68    Temp 98.1 °F (36.7 °C)    Resp 16    Ht 5' 11\" (1.803 m)    Wt 61.1 kg (134 lb 9.6 oz)    SpO2 94%    BMI 18.77 kg/m2     Patient has been seen by Dr. Yenifer Hollingsworth: see his progress note for exam details. Recent Results (from the past 24 hour(s))   GLUCOSE, POC    Collection Time: 09/17/17  4:50 PM   Result Value Ref Range    Glucose (POC) 252 (H) 65 - 100 mg/dL   GLUCOSE, POC    Collection Time: 09/17/17 10:04 PM   Result Value Ref Range    Glucose (POC) 252 (H) 65 - 100 mg/dL   GLUCOSE, POC    Collection Time: 09/18/17  6:43 AM   Result Value Ref Range    Glucose (POC) 155 (H) 65 - 100 mg/dL   GLUCOSE, POC    Collection Time: 09/18/17 11:24 AM   Result Value Ref Range    Glucose (POC) 289 (H) 65 - 100 mg/dL         Patient Instructions:   Current Discharge Medication List      START taking these medications    Details   carvedilol (COREG) 6.25 mg tablet Take 1 Tab by mouth two (2) times daily (with meals). Qty: 60 Tab, Refills: 11      lisinopril (PRINIVIL, ZESTRIL) 5 mg tablet Take 1 Tab by mouth daily. Qty: 30 Tab, Refills: 11      aspirin 81 mg chewable tablet Take 1 Tab by mouth daily. isosorbide mononitrate ER (IMDUR) 30 mg tablet Take 1 Tab by mouth daily. Qty: 30 Tab, Refills: 11      insulin lispro (HUMALOG) 100 unit/mL injection INITIATE INSULIN CORRECTIVE PROTOCOL:  Normal Insulin Sensitivity   For Blood Sugar (mg/dL) of:     Less than 150 =   0 units           150 -199 =   2 units  200 -249 =   4 units  250 -299 =   6 units  300 -349 =   8 units  350 and above = 10 units and Call Physician  Qty: 1 Vial, Refills: 0         CONTINUE these medications which have NOT CHANGED    Details   apixaban (ELIQUIS) 5 mg tablet Take 5 mg by mouth two (2) times a day. Indications: Cerebral Thromboembolism Prevention      nitroglycerin (NITROSTAT) 0.4 mg SL tablet Take 1 Tab by mouth every five (5) minutes as needed for Chest Pain.  Sit/Lay down then put one tab under the tongue every 5 minutes as needed for chest pain for 3 doses  Qty: 1 Bottle, Refills: 0      atorvastatin (LIPITOR) 40 mg tablet Take 1 Tab by mouth nightly. Qty: 30 Tab, Refills: 2      magnesium oxide (MAG-OX) 400 mg tablet Take 1 tablet by mouth daily.   Qty: 30 tablet, Refills: 0         STOP taking these medications       amLODIPine (NORVASC) 10 mg tablet Comments:   Reason for Stopping:         aspirin (ASPIRIN) 325 mg tablet Comments:   Reason for Stopping:         glyBURIDE (DIABETA) 2.5 mg tablet Comments:   Reason for Stopping:                 Signed:  Sam Dumas PA-C  9/18/2017  4:04 PM

## 2017-09-18 NOTE — PROGRESS NOTES
Verbal and bedside report received from [place], [facility].

## 2017-09-18 NOTE — PROGRESS NOTES
Problem: Mobility Impaired (Adult and Pediatric)  Goal: *Acute Goals and Plan of Care (Insert Text)  STG:  (1.)Mr. Rossi Colin will move from supine to sit and sit to supine , scoot up and down and roll side to side with SUPERVISION within 3 day(s). (2.)Mr. Rossi Colin will transfer from bed to chair and chair to bed with CONTACT GUARD ASSIST using the least restrictive device within 3 day(s). (3.)Mr. Rossi Colin will ambulate with CONTACT GUARD ASSIST for 50 feet with the least restrictive device within 3 day(s). (4.)Mr. Rossi Colin will participate in therapeutic activity/exerices x 12 minutes for increased strength within 3 days. LTG:  (1.)Mr. Rossi Colin will move from supine to sit and sit to supine , scoot up and down and roll side to side in bed with MODIFIED INDEPENDENCE within 7 day(s). (2.)Mr. Rossi Colin will transfer from bed to chair and chair to bed with SUPERVISION using the least restrictive device within 7 day(s). (3.)Mr. Rossi Colin will ambulate with SUPERVISION for 75 feet with the least restrictive device within 7 day(s). (4.)Mr. Rossi Colin will participate in therapeutic activity/exerices x 23 minutes for increased strength within 7 days. ________________________________________________________________________________________________      PHYSICAL THERAPY: Daily Note, Treatment Day: 2nd and PM 9/18/2017  INPATIENT: Hospital Day: 8  Payor: Jose Davila / Plan: [id number] PPO/PFFS / Product Type: [employer] Medicare /      NAME/AGE/GENDER: Kerwin Rizzo is a 67 y.o. male        PRIMARY DIAGNOSIS: Syncope and collapse  Syncope and collapse  Syncope Syncope and collapse Syncope and collapse        ICD-10: Treatment Diagnosis:       · Generalized Muscle Weakness (M62.81)  · Difficulty in walking, Not elsewhere classified (R26.2)  · Other abnormalities of gait and mobility (R26.89)  · History of falling (Z91.81)   Precaution/Allergies:  Review of patient's allergies indicates no known allergies.        ASSESSMENT: Mr. Britton Moralez is a pleasant  67 y.o. male in the hospital for the above that occurred at a grocery store. Pt has a PMH of CVA with apparent residual dysphasia and is slightly difficult to understand. He was able to come to sitting on edge of bed with modified independence. He then stood with supervision and able to put battery pack for life vest over his shoulder independently. He ambulated in chan with CGA x 1 with a two wheel rolling walker for 300 feet with deficits noted below . Demonstrated increased independence with transfers/gait today. Performed exercises in room. Mr. Britton Moralez appears to be functioning below his baseline and could benefit from skilled PT. This section established at most recent assessment   PROBLEM LIST (Impairments causing functional limitations):  1. Decreased Strength  2. Decreased ADL/Functional Activities  3. Decreased Transfer Abilities  4. Decreased Ambulation Ability/Technique  5. Decreased Balance  6. Decreased Activity Tolerance  7. Decreased Cognition    INTERVENTIONS PLANNED: (Benefits and precautions of physical therapy have been discussed with the patient.)  1. Balance Exercise  2. Bed Mobility  3. Family Education  4. Gait Training  5. Neuromuscular Re-education/Strengthening  6. Therapeutic Activites  7. Therapeutic Exercise/Strengthening  8. Transfer Training  9. Group Therapy      TREATMENT PLAN: Frequency/Duration: 3 times a week for duration of hospital stay  Rehabilitation Potential For Stated Goals: GOOD      RECOMMENDED REHABILITATION/EQUIPMENT: (at time of discharge pending progress): Due to the probability of continued deficits (see above) this patient will likely need continued skilled physical therapy after discharge.   Equipment:   · None at this time                   HISTORY:   History of Present Injury/Illness (Reason for Referral):  Per H&P:    HPI:   Mr. Britton Moralez is a 68 yo M with PMHx of CAD s/p CABG x2 (1st one when he was 36 yo), chronic systolic CHF with EF 40% in 10/10/2015 followed by Dr. Shakir Cabello MD, IDDM on novolog Mix 70/30 12 UI BID, HTN, Hx of recurrent CVA on chronic eliquis, hx of noncompliance with medication presented to [facility] brought by EMS after he had a syncopal event while he was doing his groceries. Unknown how log LOC was. Unknown if head trauma. States that prior to that he had severe chest pain in midsternal area 7/10 , w/o radiation for  approx 6-0 minutes. Received  mg  And 1 L NS per EMS. Mr. Lee Gordon states that he didn't took his insulin for florence=porx 10 days. He states that he didn't took his other medications either, although family at bedside states that he is taking only his eliquis. Complains of polyuria and polydipsia in the last week. In ED WBC:6.2, BS:541, Cr:1.38, Trop I:0.19, EKG with old LBBB, sinus tach with fusion complexes. 10+ point ROS done and is negative except as noted in HPI. Past Medical History/Comorbidities:   Mr. Lee Gordon  has a past medical history of CVA (cerebral infarction) (2010); Diabetes ([facility] [address] and HTN (hypertension). Mr. Lee Gordon  has a past surgical history that includes cardiac surg procedure unlist.  Social History/Living Environment:   Home Environment: Private residence  # Steps to Enter: 4  Rails to Enter: Yes  Hand Rails : Right  One/Two Story Residence: [facility] level  # of Interior Steps: 1  Living Alone: No  Support Systems: Child(trevon), Spouse/Significant Other/Partner  Patient Expects to be Discharged to[de-identified] Unknown  Current DME Used/Available at Home: Cane, quad, Walker, rolling  Tub or Shower Type: Tub/Shower combination  Prior Level of Function/Work/Activity:  Lives with wife in split level home with steps to enter. Reports that he is independent with ADLs (which daughter contradicted) and uses a quad cane for ambulation (daughter reported he forget to). Also has had several recent falls.       Number of Personal Factors/Comorbidities that affect the Plan of Care:  CVA  DM  Falls 3+: HIGH COMPLEXITY   EXAMINATION:   Most Recent Physical Functioning:   Gross Assessment:                  Posture:     Balance:  Sitting: Impaired  Sitting - Static: Good (unsupported)  Sitting - Dynamic: Prop sitting  Standing: Impaired  Standing - Static: Fair  Standing - Dynamic : Fair Bed Mobility:  Supine to Sit: Modified independent  Scooting: Modified independent  Wheelchair Mobility:     Transfers:  Sit to Stand: Supervision  Stand to Sit: Supervision  Bed to Chair: Stand-by asssistance  Gait:     Base of Support: Center of gravity altered  Speed/Mirta: Slow  Step Length: Right shortened;Left shortened  Gait Abnormalities: Decreased step clearance  Distance (ft): 300 Feet (ft)  Assistive Device: Walker, rolling  Ambulation - Level of Assistance: Stand-by asssistance       Body Structures Involved:  1. Nerves  2. Heart  3. Muscles Body Functions Affected:  1. Mental  2. Cardio  3. Neuromusculoskeletal  4. Movement Related Activities and Participation Affected:  1. Learning and Applying Knowledge  2. General Tasks and Demands  3. Communication  4. Mobility  5. Self Care  6. Domestic Life  7. Community, Social and Ripley Braman   Number of elements that affect the Plan of Care: 4+: HIGH COMPLEXITY   CLINICAL PRESENTATION:   Presentation: Evolving clinical presentation with changing clinical characteristics: MODERATE COMPLEXITY   CLINICAL DECISION MAKIN [facility] Inpatient Short Form  How much difficulty does the patient currently have. .. Unable A Lot A Little None   1. Turning over in bed (including adjusting bedclothes, sheets and blankets)? [ ] 1   [ ] 2   [ ] 3   [X] 4   2. Sitting down on and standing up from a chair with arms ( e.g., wheelchair, bedside commode, etc.)   [ ] 1   [ ] 2   [X] 3   [ ] 4   3. Moving from lying on back to sitting on the side of the bed?    [ ] 1   [ ] 2   [X] 3   [ ] 4   How much help from another person does the patient currently need... Total A Lot A Little None   4. Moving to and from a bed to a chair (including a wheelchair)? [ ] 1   [ ] 2   [X] 3   [ ] 4   5. Need to walk in hospital room? [ ] 1   [ ] 2   [X] 3   [ ] 4   6. Climbing 3-5 steps with a railing? [ ] 1   [ ] 2   [X] 3   [ ] 4   © 2007, Trustees of [address] [place] [address] [address] [address], under license to [employer]. All rights reserved    Score:  Initial: 19 Most Recent: X (Date: -- )     Interpretation of Tool:  Represents activities that are increasingly more difficult (i.e. Bed mobility, Transfers, Gait). Score 24 23 22-20 19-15 14-10 9-7 6       Modifier CH CI CJ CK CL CM CN         · Mobility - Walking and Moving Around:               - CURRENT STATUS:    CK - 40%-59% impaired, limited or restricted               - GOAL STATUS:           CJ - 20%-39% impaired, limited or restricted               - D/C STATUS:                       ---------------To be determined---------------  Payor: [employer] MEDICARE / Plan: [facility] [employer] MEDICARE CHOICE PPO/PFFS / Product Type: Managed Care Medicare /       Medical Necessity:     · Patient demonstrates good rehab potential due to higher previous functional level. Reason for Services/Other Comments:  · Patient continues to require skilled intervention due to decreased balance, ambulation, and functional mobility. Use of outcome tool(s) and clinical judgement create a POC that gives a: Questionable prediction of patient's progress: MODERATE COMPLEXITY                 TREATMENT:   (In addition to Assessment/Re-Assessment sessions the following treatments were rendered)   Pre-treatment Symptoms/Complaints:  Improved cognition today. Pain: Initial:   Pain Intensity 1: 0  Post Session:  0      Therapeutic Exercise: (  25 minutes):  Exercises per grid below to improve mobility, strength, balance and coordination. Required minimal visual and verbal cues to promote proper body alignment.   Progressed complexity of movement as indicated. Date:  9/18/17 Date:   Date:     Activity/Exercise Parameters Parameters Parameters   ambulation 300'   RW  SBA     Standing heel raises 2x10 AB     Standing toe raises 2x10 AB     Hip flexion in stand 2x10 AB                           Braces/Orthotics/Lines/Etc:   · O2 Device: Room air  Treatment/Session Assessment:    · Response to Treatment:    · Interdisciplinary Collaboration:Physical Therapist, Registered Nurse and Certified Nursing Assistant/Patient Care Technician  · After treatment position/precautions:  · Up in chair, Bed alarm/tab alert on, Bed/Chair-wheels locked, Call light within reach and PCT notified  · Compliance with Program/Exercises: Will assess as treatment progresses. · Recommendations/Intent for next treatment session: \"Next visit will focus on advancements to more challenging activities and reduction in assistance provided\".   Total Treatment Duration:PT Patient Time In/Time Out  Time In: 1520  Time Out: Nino 621, PT, DPT

## 2017-09-19 ENCOUNTER — PATIENT OUTREACH (OUTPATIENT)
Dept: CASE MANAGEMENT | Age: 77
End: 2017-09-19

## 2017-09-19 NOTE — PROGRESS NOTES
Per [facility] patient discharged to [facility] Preferred [facility] on 09/18/2017, Care Coordinator will schedule follow up call in 21 days post acute discharge to home. This note will not be viewable in [address]

## 2017-10-10 ENCOUNTER — PATIENT OUTREACH (OUTPATIENT)
Dept: CASE MANAGEMENT | Age: 77
End: 2017-10-10

## 2017-10-10 NOTE — PROGRESS NOTES
This note will not be viewable in [address] [facility] to Home-Transition of Care Discharge Follow-Up Questionnaire        Date/Time of Call: October 10, 2017 2:15PM   What was the patient hospitalized for? Patient hospitalized for Syncope and Collapse            Does the patient understand his/her diagnosis and/or treatment and what happened during the hospitalization? Patient's spouse states understanding of patient diagnosis and treatment during hospitalization. Did the patient receive discharge instructions? Patient's spouse states discharge instructions explained and received before patient discharge to [facility] on 09/19/2017   Review any discharge instructions (see notes in [address] [facility]). Ask patient if they understand these. Do they have any questions? NA   Were home services ordered (nursing, PT, OT, ST, etc.)? Yes, patient's spouse home health services ordered. If so, has the first visit occurred? If not, why? (Assist with coordination of services if necessary.) Patient's spouse states first visit will occur today 10/10/2017. Was any DME ordered? No durable medical equipment ordered. If so, has it been received? If not, why?  (Assist with coordination of arranging DME orders if necessary. ) NA         Complete a review of all medications (new, continued and discontinued meds per the D/C instructions and medication tab in [facility]). Care Coordinator reviewed all medications with patient's spouse per [facility]. Were all new prescriptions filled? If not, why?  (Assist with obtainment of medications if necessary. ) NA         Does the patient understand the purpose and dosing instructions for all medications? (If patient has questions, provide explanation and education.) Patient's spouse states understanding of patient medication purposes and dosing instructions.    Does the patient have any problems in performing ADLs? (If patient is unable to perform ADLs  what is the limiting factor(s)? Do they have a support system that can assist? If no support system is present, discuss possible assistance that they may be able to obtain.) Patient states she is independent with ADL's ad ambulation. Patient's spouse states patient is doing good and is recovering well. Does the patient have all follow-up appointments scheduled? Has transportation been arranged? [facility] follow-up should be within 7 days of discharge; all others should have PCP follow-up within 7 days of discharge; follow-ups with other specialists as appropriate or ordered.) Yes         Christiano Matthewdon, 10/11/2017 with Tawanda Neves. Any other questions or concerns expressed by the patient? Patient's spouse states no questions or concerns. Schedule next appointment with RANJIT PRABHAKAR Coordinator or refer to RN Case Manager/  as appropriate. No further needs identified, patient's spouse instructed to call Care Coordinator if further questions or concerns arise.    LEIGH ANN Call Completed By: Carolee Malin LPN  Care Coordinator   [facility]

## 2018-06-21 NOTE — PROGRESS NOTES
Bedside and Verbal shift change report given to self (oncoming nurse) by Frankie Winters (offgoing nurse). Report included the following information SBAR, Kardex, MAR and Recent Results. independent

## 2021-08-03 PROBLEM — R55 SYNCOPE: Status: RESOLVED | Noted: 2017-09-13 | Resolved: 2021-08-03
